# Patient Record
Sex: MALE | Race: WHITE | NOT HISPANIC OR LATINO | Employment: OTHER
[De-identification: names, ages, dates, MRNs, and addresses within clinical notes are randomized per-mention and may not be internally consistent; named-entity substitution may affect disease eponyms.]

---

## 2019-11-07 ENCOUNTER — HEALTH MAINTENANCE LETTER (OUTPATIENT)
Age: 64
End: 2019-11-07

## 2020-02-17 ENCOUNTER — HEALTH MAINTENANCE LETTER (OUTPATIENT)
Age: 65
End: 2020-02-17

## 2020-07-13 ENCOUNTER — APPOINTMENT (OUTPATIENT)
Dept: MRI IMAGING | Facility: CLINIC | Age: 65
End: 2020-07-13
Attending: EMERGENCY MEDICINE
Payer: COMMERCIAL

## 2020-07-13 ENCOUNTER — HOSPITAL ENCOUNTER (OUTPATIENT)
Facility: CLINIC | Age: 65
Setting detail: OBSERVATION
Discharge: HOME | End: 2020-07-13
Attending: EMERGENCY MEDICINE | Admitting: INTERNAL MEDICINE
Payer: COMMERCIAL

## 2020-07-13 ENCOUNTER — APPOINTMENT (OUTPATIENT)
Dept: CT IMAGING | Facility: CLINIC | Age: 65
End: 2020-07-13
Attending: EMERGENCY MEDICINE
Payer: COMMERCIAL

## 2020-07-13 VITALS
HEIGHT: 69 IN | OXYGEN SATURATION: 97 % | DIASTOLIC BLOOD PRESSURE: 72 MMHG | SYSTOLIC BLOOD PRESSURE: 122 MMHG | BODY MASS INDEX: 26.66 KG/M2 | WEIGHT: 180 LBS | RESPIRATION RATE: 18 BRPM | TEMPERATURE: 97.3 F | HEART RATE: 56 BPM

## 2020-07-13 DIAGNOSIS — G45.9 TIA (TRANSIENT ISCHEMIC ATTACK): ICD-10-CM

## 2020-07-13 DIAGNOSIS — E78.2 COMBINED HYPERLIPIDEMIA: ICD-10-CM

## 2020-07-13 PROBLEM — Z86.73 HISTORY OF CVA (CEREBROVASCULAR ACCIDENT): Chronic | Status: ACTIVE | Noted: 2020-07-13

## 2020-07-13 LAB
ANION GAP SERPL CALCULATED.3IONS-SCNC: 5 MMOL/L (ref 3–14)
APTT PPP: 32 SEC (ref 22–37)
BASOPHILS # BLD AUTO: 0 10E9/L (ref 0–0.2)
BASOPHILS NFR BLD AUTO: 0.5 %
BUN SERPL-MCNC: 27 MG/DL (ref 7–30)
CALCIUM SERPL-MCNC: 9 MG/DL (ref 8.5–10.1)
CHLORIDE SERPL-SCNC: 111 MMOL/L (ref 94–109)
CO2 SERPL-SCNC: 23 MMOL/L (ref 20–32)
CREAT SERPL-MCNC: 0.95 MG/DL (ref 0.66–1.25)
DIFFERENTIAL METHOD BLD: NORMAL
EOSINOPHIL # BLD AUTO: 0.2 10E9/L (ref 0–0.7)
EOSINOPHIL NFR BLD AUTO: 2.6 %
ERYTHROCYTE [DISTWIDTH] IN BLOOD BY AUTOMATED COUNT: 14.1 % (ref 10–15)
GFR SERPL CREATININE-BSD FRML MDRD: 83 ML/MIN/{1.73_M2}
GLUCOSE SERPL-MCNC: 97 MG/DL (ref 70–99)
HCT VFR BLD AUTO: 47.4 % (ref 40–53)
HGB BLD-MCNC: 15.2 G/DL (ref 13.3–17.7)
IMM GRANULOCYTES # BLD: 0 10E9/L (ref 0–0.4)
IMM GRANULOCYTES NFR BLD: 0.2 %
INR PPP: 1.08 (ref 0.86–1.14)
LYMPHOCYTES # BLD AUTO: 1.4 10E9/L (ref 0.8–5.3)
LYMPHOCYTES NFR BLD AUTO: 21.6 %
MCH RBC QN AUTO: 27 PG (ref 26.5–33)
MCHC RBC AUTO-ENTMCNC: 32.1 G/DL (ref 31.5–36.5)
MCV RBC AUTO: 84 FL (ref 78–100)
MONOCYTES # BLD AUTO: 0.7 10E9/L (ref 0–1.3)
MONOCYTES NFR BLD AUTO: 10.7 %
NEUTROPHILS # BLD AUTO: 4.2 10E9/L (ref 1.6–8.3)
NEUTROPHILS NFR BLD AUTO: 64.4 %
NRBC # BLD AUTO: 0 10*3/UL
NRBC BLD AUTO-RTO: 0 /100
PLATELET # BLD AUTO: 175 10E9/L (ref 150–450)
POTASSIUM SERPL-SCNC: 4.1 MMOL/L (ref 3.4–5.3)
RBC # BLD AUTO: 5.63 10E12/L (ref 4.4–5.9)
SODIUM SERPL-SCNC: 139 MMOL/L (ref 133–144)
TROPONIN I SERPL-MCNC: <0.015 UG/L (ref 0–0.04)
WBC # BLD AUTO: 6.4 10E9/L (ref 4–11)

## 2020-07-13 PROCEDURE — 99207 ZZC CDG-HISTORY COMP: MEETS EXP. PROBLEM FOCUSED-DOWN CODED LACK OF ROS: CPT | Performed by: INTERNAL MEDICINE

## 2020-07-13 PROCEDURE — 96374 THER/PROPH/DIAG INJ IV PUSH: CPT

## 2020-07-13 PROCEDURE — 70553 MRI BRAIN STEM W/O & W/DYE: CPT

## 2020-07-13 PROCEDURE — C9803 HOPD COVID-19 SPEC COLLECT: HCPCS

## 2020-07-13 PROCEDURE — 25000128 H RX IP 250 OP 636: Performed by: FAMILY MEDICINE

## 2020-07-13 PROCEDURE — 93005 ELECTROCARDIOGRAM TRACING: CPT

## 2020-07-13 PROCEDURE — 70450 CT HEAD/BRAIN W/O DYE: CPT

## 2020-07-13 PROCEDURE — 93010 ELECTROCARDIOGRAM REPORT: CPT | Mod: Z6 | Performed by: EMERGENCY MEDICINE

## 2020-07-13 PROCEDURE — 25500064 ZZH RX 255 OP 636: Performed by: EMERGENCY MEDICINE

## 2020-07-13 PROCEDURE — G0378 HOSPITAL OBSERVATION PER HR: HCPCS

## 2020-07-13 PROCEDURE — 85610 PROTHROMBIN TIME: CPT | Performed by: EMERGENCY MEDICINE

## 2020-07-13 PROCEDURE — 85730 THROMBOPLASTIN TIME PARTIAL: CPT | Performed by: EMERGENCY MEDICINE

## 2020-07-13 PROCEDURE — 25800030 ZZH RX IP 258 OP 636: Performed by: EMERGENCY MEDICINE

## 2020-07-13 PROCEDURE — 25000128 H RX IP 250 OP 636: Performed by: RADIOLOGY

## 2020-07-13 PROCEDURE — 25000132 ZZH RX MED GY IP 250 OP 250 PS 637: Performed by: EMERGENCY MEDICINE

## 2020-07-13 PROCEDURE — 85025 COMPLETE CBC W/AUTO DIFF WBC: CPT | Performed by: EMERGENCY MEDICINE

## 2020-07-13 PROCEDURE — A9585 GADOBUTROL INJECTION: HCPCS | Performed by: EMERGENCY MEDICINE

## 2020-07-13 PROCEDURE — 99291 CRITICAL CARE FIRST HOUR: CPT | Mod: 25 | Performed by: EMERGENCY MEDICINE

## 2020-07-13 PROCEDURE — 99291 CRITICAL CARE FIRST HOUR: CPT | Mod: 25

## 2020-07-13 PROCEDURE — 80048 BASIC METABOLIC PNL TOTAL CA: CPT | Performed by: EMERGENCY MEDICINE

## 2020-07-13 PROCEDURE — U0003 INFECTIOUS AGENT DETECTION BY NUCLEIC ACID (DNA OR RNA); SEVERE ACUTE RESPIRATORY SYNDROME CORONAVIRUS 2 (SARS-COV-2) (CORONAVIRUS DISEASE [COVID-19]), AMPLIFIED PROBE TECHNIQUE, MAKING USE OF HIGH THROUGHPUT TECHNOLOGIES AS DESCRIBED BY CMS-2020-01-R: HCPCS | Performed by: EMERGENCY MEDICINE

## 2020-07-13 PROCEDURE — 96372 THER/PROPH/DIAG INJ SC/IM: CPT | Mod: XS

## 2020-07-13 PROCEDURE — 25000128 H RX IP 250 OP 636: Performed by: EMERGENCY MEDICINE

## 2020-07-13 PROCEDURE — 84484 ASSAY OF TROPONIN QUANT: CPT | Performed by: EMERGENCY MEDICINE

## 2020-07-13 PROCEDURE — 70496 CT ANGIOGRAPHY HEAD: CPT

## 2020-07-13 PROCEDURE — 99292 CRITICAL CARE ADDL 30 MIN: CPT

## 2020-07-13 PROCEDURE — 99218 ZZC INITIAL OBSERVATION CARE,LEVL I: CPT | Performed by: INTERNAL MEDICINE

## 2020-07-13 PROCEDURE — 25000125 ZZHC RX 250: Performed by: RADIOLOGY

## 2020-07-13 RX ORDER — ONDANSETRON 4 MG/1
4 TABLET, ORALLY DISINTEGRATING ORAL EVERY 6 HOURS PRN
Status: DISCONTINUED | OUTPATIENT
Start: 2020-07-13 | End: 2020-07-13 | Stop reason: HOSPADM

## 2020-07-13 RX ORDER — ONDANSETRON 4 MG/1
4 TABLET, ORALLY DISINTEGRATING ORAL EVERY 6 HOURS PRN
Status: DISCONTINUED | OUTPATIENT
Start: 2020-07-13 | End: 2020-07-13

## 2020-07-13 RX ORDER — OLANZAPINE 10 MG/2ML
5 INJECTION, POWDER, FOR SOLUTION INTRAMUSCULAR DAILY PRN
Status: DISCONTINUED | OUTPATIENT
Start: 2020-07-13 | End: 2020-07-13 | Stop reason: HOSPADM

## 2020-07-13 RX ORDER — ASPIRIN 325 MG
325 TABLET ORAL DAILY
COMMUNITY
Start: 2020-08-04 | End: 2021-04-11

## 2020-07-13 RX ORDER — ATORVASTATIN CALCIUM 20 MG/1
40 TABLET, FILM COATED ORAL AT BEDTIME
Status: DISCONTINUED | OUTPATIENT
Start: 2020-07-13 | End: 2020-07-13 | Stop reason: HOSPADM

## 2020-07-13 RX ORDER — ASPIRIN 81 MG/1
81 TABLET ORAL DAILY
Status: DISCONTINUED | OUTPATIENT
Start: 2020-07-14 | End: 2020-07-13 | Stop reason: HOSPADM

## 2020-07-13 RX ORDER — CLOPIDOGREL BISULFATE 75 MG/1
300 TABLET ORAL ONCE
Status: COMPLETED | OUTPATIENT
Start: 2020-07-13 | End: 2020-07-13

## 2020-07-13 RX ORDER — ASPIRIN 81 MG/1
81 TABLET, CHEWABLE ORAL ONCE
Status: COMPLETED | OUTPATIENT
Start: 2020-07-13 | End: 2020-07-13

## 2020-07-13 RX ORDER — ATORVASTATIN CALCIUM 40 MG/1
40 TABLET, FILM COATED ORAL DAILY
Qty: 30 TABLET | Refills: 0 | Status: ON HOLD | OUTPATIENT
Start: 2020-07-13 | End: 2021-04-18

## 2020-07-13 RX ORDER — LORAZEPAM 2 MG/ML
1 INJECTION INTRAMUSCULAR ONCE
Status: COMPLETED | OUTPATIENT
Start: 2020-07-13 | End: 2020-07-13

## 2020-07-13 RX ORDER — ONDANSETRON 2 MG/ML
4 INJECTION INTRAMUSCULAR; INTRAVENOUS EVERY 6 HOURS PRN
Status: DISCONTINUED | OUTPATIENT
Start: 2020-07-13 | End: 2020-07-13 | Stop reason: HOSPADM

## 2020-07-13 RX ORDER — NICOTINE POLACRILEX 4 MG/1
20 GUM, CHEWING ORAL
Status: ON HOLD | COMMUNITY
Start: 2020-01-21 | End: 2021-04-11

## 2020-07-13 RX ORDER — ONDANSETRON 2 MG/ML
4 INJECTION INTRAMUSCULAR; INTRAVENOUS EVERY 6 HOURS PRN
Status: DISCONTINUED | OUTPATIENT
Start: 2020-07-13 | End: 2020-07-13

## 2020-07-13 RX ORDER — ACETAMINOPHEN 325 MG/1
650 TABLET ORAL EVERY 4 HOURS PRN
Status: DISCONTINUED | OUTPATIENT
Start: 2020-07-13 | End: 2020-07-13 | Stop reason: HOSPADM

## 2020-07-13 RX ORDER — NALOXONE HYDROCHLORIDE 0.4 MG/ML
.1-.4 INJECTION, SOLUTION INTRAMUSCULAR; INTRAVENOUS; SUBCUTANEOUS
Status: DISCONTINUED | OUTPATIENT
Start: 2020-07-13 | End: 2020-07-13 | Stop reason: HOSPADM

## 2020-07-13 RX ORDER — GADOBUTROL 604.72 MG/ML
8 INJECTION INTRAVENOUS ONCE
Status: COMPLETED | OUTPATIENT
Start: 2020-07-13 | End: 2020-07-13

## 2020-07-13 RX ORDER — AMOXICILLIN 250 MG
1-2 CAPSULE ORAL 2 TIMES DAILY
Status: DISCONTINUED | OUTPATIENT
Start: 2020-07-13 | End: 2020-07-13 | Stop reason: HOSPADM

## 2020-07-13 RX ORDER — CLOPIDOGREL BISULFATE 75 MG/1
75 TABLET ORAL DAILY
Qty: 21 TABLET | Refills: 0 | Status: ON HOLD | OUTPATIENT
Start: 2020-07-14 | End: 2021-04-11

## 2020-07-13 RX ORDER — CLOPIDOGREL BISULFATE 75 MG/1
75 TABLET ORAL DAILY
Status: DISCONTINUED | OUTPATIENT
Start: 2020-07-14 | End: 2020-07-13 | Stop reason: HOSPADM

## 2020-07-13 RX ORDER — IOPAMIDOL 755 MG/ML
70 INJECTION, SOLUTION INTRAVASCULAR ONCE
Status: COMPLETED | OUTPATIENT
Start: 2020-07-13 | End: 2020-07-13

## 2020-07-13 RX ADMIN — OLANZAPINE 5 MG: 10 INJECTION, POWDER, FOR SOLUTION INTRAMUSCULAR at 13:06

## 2020-07-13 RX ADMIN — SODIUM CHLORIDE 100 ML: 9 INJECTION, SOLUTION INTRAVENOUS at 10:45

## 2020-07-13 RX ADMIN — IOPAMIDOL 70 ML: 755 INJECTION, SOLUTION INTRAVENOUS at 10:45

## 2020-07-13 RX ADMIN — LORAZEPAM 1 MG: 2 INJECTION INTRAMUSCULAR; INTRAVENOUS at 12:10

## 2020-07-13 RX ADMIN — CLOPIDOGREL BISULFATE 300 MG: 75 TABLET ORAL at 15:00

## 2020-07-13 RX ADMIN — SODIUM CHLORIDE 500 ML: 9 INJECTION, SOLUTION INTRAVENOUS at 12:10

## 2020-07-13 RX ADMIN — ASPIRIN 81 MG 81 MG: 81 TABLET ORAL at 15:00

## 2020-07-13 RX ADMIN — GADOBUTROL 8 ML: 604.72 INJECTION INTRAVENOUS at 11:44

## 2020-07-13 ASSESSMENT — ENCOUNTER SYMPTOMS
NECK PAIN: 0
CHILLS: 0
VOMITING: 0
HEADACHES: 0
FEVER: 0
DIFFICULTY URINATING: 0
EYE PAIN: 0
ABDOMINAL PAIN: 0
COUGH: 0
HALLUCINATIONS: 1
DIARRHEA: 0
NECK STIFFNESS: 0
DIZZINESS: 0
SORE THROAT: 0
SHORTNESS OF BREATH: 0
EYE REDNESS: 0
NAUSEA: 0

## 2020-07-13 NOTE — CONSULTS
[facility]    Stroke Telephone Note    I was called by Dr. Tim Valdovinos on 07/13/20 at 1037 regarding patient Rajiv Rodriguez. The patient is a 65 year old male with history of left cerebellar and medullary infarct 2012, schizophrenia, and hyperlipidemia who presents to the [facility] ED as a stroke code for transient right hand weakness.     Stroke Code Data  (for stroke code without tele)  Stroke code activated 07/13/20   1037   First stroke provider response 07/13/20   1038   Last known normal 07/13/20   1014   Time of discovery   (or onset of symptoms) 07/13/20   1015   Head CT read by me 07/13/20   1048   Was stroke code de-escalated? Yes 07/13/20 1100  other (see comments) Symptoms resolved, no indication for acute intervention     TPA Treatment   Not given due to minor/isolated/quickly resolving symptoms.    Endovascular Treatment  Not initiated due to absence of proximal vessel occlusion    Impression  Transient Ischemic Attack    Recommendations  Transient Ischemic Attack Recommendations  - ABCD2 Score: 3  - Obs overnight  - Neurochecks Q 4 hours  - Daily aspirin 81 mg for secondary stroke prevention x21 days then 325 mg daily when plavix course completed  - Plavix (clopidogrel) 300 mg PO loading dose x 1  - Plavix (clopidogrel) 75 mg PO Daily x21 days  - Statin: continue atorvastatin 40 mg daily, consider increase to 80 in response to LDL, goal <70  - MRI Stroke Protocol completed, no acute stroke  - TTE with Bubble Study    My recommendations are based on the limited information provided on the phone by Dr. Tim Valdovinos. They are not intended to replace the clinical judgment of Dr. Tim Valdovinos which should always be utilized to provide the most appropriate care to meet the unique needs of this patient.  I was not requested to personally see or examine the patient at this time.    The Stroke Staff is Dr. Jauregui.    Catrachita Hernandez MD  Vascular Neurology Fellow  To  "page me or covering stroke neurology team member, click here: AMCOM   Choose \"On Call\" tab at top, then search dropdown box for \"Neurology Adult\", select location, press Enter, then look for stroke/neuro ICU/telestroke.    "

## 2020-07-13 NOTE — H&P
"      [facility]    History and Physical - Hospitalist Service       Date of Admission:  7/13/2020    Assessment & Plan   Rajiv Rodriguez is a 65 year old male admitted on 7/13/2020. He has a history of cerebellar CVA in 2012 without sequelae and Schizophrenia (Currently untreated). He presents with right upper extremity weakness with waxing and waning symptoms.     Transient ischemic attack  Recurring right upper extremity weakness episodes. Stroke neurology consulted in ED. CT head, CTA head and neck negative. MRI shows- \"Old infarcts are present involving the bilateral cerebellum. Lacunar infarct is present involving the left basal ganglia/internal capsule. No evidence of acute ischemia, hemorrhage, mass, mass effect, or hydrocephalus. Mild volume loss is present. Scattered white matter T2 hyperintensities likely represent chronic small vessel ischemic change.\"    Currently Asymptomatic.   - Observation overnight  - Neurochecks Q 2 hours  - Daily aspirin 81 mg for secondary stroke prevention x21 days then 325 mg daily when plavix course completed  - Plavix (clopidogrel) 300 mg PO loading dose x 1 then Plavix (clopidogrel) 75 mg PO Daily x21 days  - Continue atorvastatin 40 mg daily, consider increase to 80 in response to LDL, goal <70  - TTE with Bubble Study  - Check A1c    Schizophrenia  Pers ister he has probably been off of medications for several years. He was hearing voices 2 nights ago, and they were on theor way for psychiatric evaluation when TIA symptoms occurred. Currently denies hallucinations, calm, comfortable.  - Olanzapine 5mg IM prn  - Outpatient follow-up upon discharge       Diet:   NPO until bedside screen  DVT Prophylaxis: Low Risk/Ambulatory with no VTE prophylaxis indicated  Clark Catheter: not present  Code Status: Full code       Covid screen pending    Disposition Plan   Expected discharge: Tomorrow, recommended to prior living arrangement once Echo done and neuro " symptoms stable.  Entered: Dipesh Jenkins MD 07/13/2020, 3:58 PM     The patient's care was discussed with the Patient and Patient's Family.    Dipesh Jenkins MD  [facility]    ______________________________________________________________________    Chief Complaint   Chief Complaint   Patient presents with     One-sided Weakness     right sided sudden onset         History is obtained from the patient, electronic health record, emergency department physician and patient's sister    History of Present Illness   Rajiv Rodriguez is a 65 year old male who was brought in by sister for acute onset right hand weakness.  They were in the car on the way to an appointment for psychiatric evaluation due to development of auditory hallucinations 2 nights ago when he started not feeling well and then says he lost control of his right hand. He was unable to move or  as usual. Patients symptoms resolved shortly after arrival, later re-occurred for a brief period in the CT scanner. Sister states he has also had some brief symptoms with last episode > 1 hour ago.        Review of Systems    The 5 point Review of Systems is negative other than noted in the HPI or here.     Past Medical History        Patient Active Problem List    Diagnosis Date Noted     History of CVA (cerebrovascular accident) 07/13/2020     Priority: High     Presented 11/23/12, symptoms of sudden unsteadiness and hoarseness of voice. 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 No evidence of large vessel disease on MRI/MRA, which looks consistent with embolic stroke. No carotid disease. FRIEDA with no cardiac source of thrombus. Placed on cardiac telemetry for >48 hours. Started on full dose asa 325 mg daily        Schizophrenia (H) 02/25/2013     Priority: High     Mixed hyperlipidemia 01/06/2011     Priority: Medium     Benign prostatic  hyperplasia with lower urinary tract symptoms 12/11/2015     Priority: Low     GERD (gastroesophageal reflux disease) 09/16/2013     Priority: Low      Past Medical History:   Diagnosis Date     Acute exacerbation of chronic paranoid schizophrenia (H) 3/1/2016     Esophageal stricture 08/28/2018    Esophageal stricture 08/28/2018, s/p dilation     Stroke (H) 11/23/2012    symptoms of sudden unsteadiness and hoarseness of voice. 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       Past Surgical History   I have reviewed this patient's surgical history and updated it with pertinent information if needed.  Past Surgical History:   Procedure Laterality Date     DILATE ESOPHAGUS  08/2017    ESOPHAGEAL DILATION     PROSTATE SURGERY  08/2016    LASER OF PROSTATE W/ GREEN LIGHT PVP       Social History   I have reviewed this patient's social history and updated it with pertinent information if needed.      Family History   I have reviewed this patient's family history and updated it with pertinent information if needed.  Family History   Problem Relation Age of Onset     Alzheimer Disease Mother      Cancer Father         skin       Prior to Admission Medications   Prior to Admission Medications   Prescriptions Last Dose Informant Patient Reported? Taking?   atorvastatin (LIPITOR) 40 MG tablet   No No   Sig: Take 1 tablet (40 mg) by mouth daily   omeprazole 20 MG tablet   Yes Yes   Sig: Take 20 mg by mouth      Facility-Administered Medications: None     Allergies   No Known Allergies    Physical Exam   Vital Signs: Temp: 98.7  F (37.1  C) Temp src: Oral BP: 108/75 Pulse: 60 Heart Rate: 53 Resp: 11 SpO2: 96 % O2 Device: None (Room air)    Weight: 180 lbs 0 oz    Constitutional: awake, alert, cooperative, no apparent distress, and appears stated age  Eyes: Lids and lashes normal, pupils equal, round and reactive to light, extra  ocular muscles intact, sclera clear, conjunctiva normal. Somwhat sustained nystagmus with lateral gaze left>right   ENT: edentulous  Respiratory: No increased work of breathing, good air exchange, clear to auscultation bilaterally, no crackles or wheezing  Cardiovascular: Normal apical impulse, regular rate and rhythm, normal S1 and S2, no S3 or S4, and no murmur noted  GI: No scars, normal bowel sounds, soft, non-distended, non-tender, no masses palpated, no hepatosplenomegally  Musculoskeletal: There is no redness, warmth, or swelling of the joints.  Full range of motion noted.  Motor strength is 5 out of 5 all extremities bilaterally.  Tone is normal.  Neurologic:   CV 2-12 are intact  Mental Status Exam:  Level of Alertness:   awake  Orientation:   person, place, time  Memory:   normal  Attention/Concentration:  normal  Coordination:  Finger/Nose:  bilateral :  normal  Rapid Alternating Movements:  bilateral :  normal  Plantar Response:  Difficult due to withdrawal  Neuropsychiatric: Affect: normal, pleasant and somewhat withdrawn    Data   Data reviewed today: I reviewed all medications, new labs and imaging results over the last 24 hours. I personally reviewed no images or EKG's today.    ROUTINE ICU LABS (Last four results)  CMP  Recent Labs   Lab 07/13/20  1040      POTASSIUM 4.1   CHLORIDE 111*   CO2 23   ANIONGAP 5   GLC 97   BUN 27   CR 0.95   GFRESTIMATED 83   GFRESTBLACK >90   LEROY 9.0     CBC  Recent Labs   Lab 07/13/20  1040   WBC 6.4   RBC 5.63   HGB 15.2   HCT 47.4   MCV 84   MCH 27.0   MCHC 32.1   RDW 14.1        INR  Recent Labs   Lab 07/13/20  1040   INR 1.08     Results for orders placed or performed during the hospital encounter of 07/13/20   CT Head w/o Contrast    Narrative    CT SCAN OF THE HEAD WITHOUT CONTRAST   7/13/2020 10:48 AM     HISTORY: Focal neuro deficit, less than 6 hours, stroke suspected.    TECHNIQUE:  Axial images of the head and coronal reformations without  IV  contrast material.  Radiation dose for this scan was reduced using  automated exposure control, adjustment of the mA and/or kV according  to patient size, or iterative reconstruction technique.    COMPARISON: None.    FINDINGS: Few minimal nonspecific white matter changes are present  without mass effect. There is also some slight asymmetry in the  inferior left cerebellum which is presumably congenital. Ventricles  and subarachnoid spaces are minimally prominent consistent with some  mild cerebral atrophy. There is no evidence for acute hemorrhage,  acute infarct, mass effect, or skull fracture. Vascular calcifications  are noted. There is some opacification in the right frontal sinus.  Visualized paranasal sinuses are otherwise clear. Mastoid air cells  are clear.      Impression    IMPRESSION: Chronic changes. No evidence for intracranial hemorrhage  or any acute process.    TSERING WILLSON MD   CTA Head Neck with Contrast    Narrative    CTA  HEAD/NECK WITH CONTRAST July 13, 2020 10:57 AM     HISTORY: Acute onset focal neuro deficit. Acute right-sided weakness.    TECHNIQUE: Axial images were obtained through the brain with  intravenous contrast. 70 mL of Isovue-370 was given. Multiplanar  reconstructions were performed. 3-D reconstructions off a remote  workstation for CT angiography were also acquired. Carotid stenoses  were provided by comparing the caliber of the proximal internal  carotid artery to the caliber of the distal internal carotid artery.  Radiation dose for this scan was reduced using automated exposure  control, adjustment of the mA and/or kV according to patient size, or  iterative reconstruction technique.    FINDINGS:    Brachiocephalic vessels: Normal.    Right carotid system: Normal.    Left carotid system: Normal.    Right vertebral artery: Normal.    Left vertebral artery: Normal.    [place]: There is some minimal calcific plaque in the carotid  siphon regions bilaterally but no  stenosis. The basilar artery is  widely patent. The proximal anterior, middle, and posterior cerebral  arteries are patent. There is no evidence for aneurysm,  thromboembolism, stenosis, or dissection.    Other findings: Degenerative changes are seen in the spine. Calcified  lymph nodes are seen in the mediastinum.      Impression    IMPRESSION:  1. Minimal calcific plaque involving the carotid siphons without  stenosis.  2. No evidence for stenosis, dissection, thromboembolism, or aneurysm.    TSERING WILLSON MD   MR Brain w/o & w Contrast    Narrative    MRI BRAIN WITHOUT AND WITH CONTRAST  7/13/2020 1:52 PM    HISTORY:  Stuttering course of right upper extremity and right lower  extremity weakness, dysmetria in bilateral upper extremities.     TECHNIQUE:  Multiplanar, multisequence MRI of the brain without and  with 8 mL Gadavist.    COMPARISON: Head CT 7/13/2020, head MRI 11/23/2012    FINDINGS:  Old infarcts are present involving the bilateral  cerebellum. Lacunar infarct is present involving the left basal  ganglia/internal capsule. No evidence of acute ischemia, hemorrhage,  mass, mass effect, or hydrocephalus. Mild volume loss is present.  Scattered white matter T2 hyperintensities likely represent chronic  small vessel ischemic change.    The visualized calvarium, tympanic cavities, mastoid cavities, and  extra cranial soft tissues are unremarkable. Mild paranasal sinus  mucosal thickening.      Impression    IMPRESSION:  1. No evidence of acute ischemia or hemorrhage.  2. Multiple old infarcts.    MEGHAN ROSE MD

## 2020-07-13 NOTE — ED NOTES
"Pt unable to perform MRI after receiving ativan IV.   Pt states \"Can't you knock me out, I hear voices in there\"   Provider updated.    MRI tech going to talk with pt at this time.  "

## 2020-07-13 NOTE — ED NOTES
Pt presents to ED with concerns of stroke like symptoms.    Code stroke called at 1035 by  Aruna  Stroke eval called at 1030. Aruna    Pt is alert and oriented.     Last known normal: 1035    Dysphagia screening deferred due to clinical situation.

## 2020-07-13 NOTE — ED NOTES
Pt present to ed with sister via private car.  Pt had sudden onset with right sided weakness.  Pt was on his way to  psych d/t schizophrenia eval (has hx, has not been on medication for a couple of years), has had auditory hallucinations since Saturday.  Pt told sister to please stop at [place] d/t sudden onset of confusion and weakness. Pt presents to ED confused, unable to tell staff his last name.  Pt symptoms resolved approx 5 minutes after arrival.

## 2020-07-13 NOTE — PROGRESS NOTES
"[place] [facility] ADMISSION NOTE    Patient admitted to room 2213 at approximately 4pm via W/C accompanied by sister and E D staff from [facility].     Verbal SBAR report received from  E D staff prior to patient arrival.     Patient ambulated with assistance to bed Patient  Appearing very tired, poor eye contact with staff but cooperative. Oriented  Sister and self. Does not answer questions from staff except yes and no  . Admission vital signs: Blood pressure 122/72, pulse 56, temperature 97.3  F (36.3  C), temperature source Oral, resp. rate 18, height 1.744 m (5' 8.66\"), weight 81.6 kg (180 lb), SpO2 97 %. PATIENT, FAMILY MEMBER, was oriented to plan of care, ROOM ORIENTATION:\"call light\",\"bed controls\",\"tv\",\"telephone\",\"bathroom\",\"visiting hours\". Patient stating to sister \"I want to go home\". Sister requests to see physician and wishes to take patient home.    Risk Assessment    The following safety risks were identified during admission: safety risks:631982. Yellow risk band  yes.     Skin Initial Assessment    This writer admitted this patient and patient refuses a skin assessment .  William score in the Adult PCS flowsheet. Appropriate interventions initiated as needed.     Secondary skin check completed by Not completed.         Education    Patient has a [place] to Observation order: YES   Observation education completed and documented:YES      Dolly Queen RN    "

## 2020-07-13 NOTE — ED PROVIDER NOTES
"  History     Chief Complaint   Patient presents with     One-sided Weakness     right sided sudden onset     HPI  Rajiv Rodriguez is a 65 year old male with history remote CVA, and schizophrenia (not any medications), dyslipidemia who was brought in by sister for acute onset right hand weakness.  They were in the car on the way downtown to an unrelated appointment when he started not feeling well and then says he lost control of his right hand.  Was unable to move or  as usual.  His sister drove him directly to [facility] emergency department.  Last known well at approximate 10:15 AM this morning (approximately 15 minutes prior to arrival).  Patient and patient's history concerned he may be having a stroke.  Patient reports that he takes full dose aspirin, a statin, and \"something for reflux\".     Upon arrival to Emergency Department, his symptoms of weakness have resolved.     The patient's PMHx, Surgical Hx, Allergies, and Medications were all reviewed with the patient.    Allergies:  No Known Allergies    Problem List:    Patient Active Problem List    Diagnosis Date Noted     Acute exacerbation of chronic paranoid schizophrenia (H) 03/01/2016     Priority: Medium     Stroke (H) 11/23/2012     Priority: Medium        Past Medical History:    Past Medical History:   Diagnosis Date     High cholesterol      Schizophrenia, schizo-affective type (H)      Unspecified cerebral artery occlusion with cerebral infarction        Past Surgical History:    No past surgical history on file.    Family History:    Family History   Problem Relation Age of Onset     Alzheimer Disease Mother      Cancer Father         skin       Social History:  Marital Status:  Single [1]  Social History     Tobacco Use     Smoking status: Never Smoker     Smokeless tobacco: Never Used   Substance Use Topics     Alcohol use: No     Drug use: No        Medications:    atorvastatin (LIPITOR) 40 MG tablet  OLANZapine (ZYPREXA) 15 MG " "tablet          Review of Systems   Constitutional: Negative for chills and fever.   HENT: Negative for congestion and sore throat.    Eyes: Negative for pain and redness.   Respiratory: Negative for cough and shortness of breath.    Cardiovascular: Negative for chest pain.   Gastrointestinal: Negative for abdominal pain, diarrhea, nausea and vomiting.   Genitourinary: Negative for difficulty urinating.   Musculoskeletal: Negative for neck pain and neck stiffness.   Skin: Negative for rash.   Neurological: Negative for dizziness and headaches.   Psychiatric/Behavioral: Positive for hallucinations. Negative for self-injury and suicidal ideas.       Physical Exam   BP: (!) 127/91  Pulse: 61  Heart Rate: 64  Temp: 98.7  F (37.1  C)  Resp: 16  Height: 174.4 cm (5' 8.66\")  Weight: 81.6 kg (180 lb)  SpO2: 99 %    Physical Exam  General: Awake, alert, and cooperative. No acute distress  Mental Status: Oriented x 3. Speech normal.  Head: Normocephalic, atraumatic, symmetric.   Eyes: Conjunctiva normal, no discharge, PERRLA 2 mm. No neglect/hemianopsia  Ears, Nose, Throat: External ears and nares normal.  No oral exudates. Oral mucosa moist.  Neck: Supple. No adenopathy. Non-tender.   Cardiovascular: Regular rate. Regular rhythm. Peripheral pulses strong. Normal capillary refill. No LE edema.   Respiratory: Normal effort. No wheezes, rales, or rhonchi bilaterally.  Chest Wall: Equal rise.  Abdomen: No tenderess, soft, non-distended. No rebound or guarding.  Back: Atraumatic.   Musculoskeletal: No gross deformity. Warm and well perfused  Neurologic: Mental status: Alert, awake. Oriented to self, date, and place. Normal speech and language. GCS 15  Cranial Nerves: II-XII intact  Motor: Follows commands x 4 extremities. Down going Babinski's. No Clonus.   Upper Extremities:   RUE: 5/5 shoulder abduction. 5/5 elbow flex/ext. 5/5 wrist flex/ext. 5/5 hand .   LUE: 5/5 shoulder abduction. 5/5 elbow flex/ext. 5/5 wrist flex/ext. " 5/5 hand .   Lower Extremities:   RLE: 5/5 hip flexion. 5/5 knee flex/ext. 5/5 ankle plantar-/dorsiflexion. 5/5 EHL.   LLE: 5/5 hip flexion. 5/5 knee flex/ext. 5/5 ankle plantar-/dorsiflexion. 5/5 EHL   Sensory: Sensation intact to light touch in all 4 extremities   Coordination: Dysmetria with finger to nose and heel to shin on right.  Skin: Warm, dry, no pallor, no erythema, no jaundice or rash.      ED Course     ED Course as of Jul 13 1510   Mon Jul 13, 2020   1044 Spoke with stroke neurology          Procedures             EKG Interpretation:      Interpreted by Tim Almazan MD  Time reviewed: 1115  Symptoms at time of EKG: weakness  Rhythm: sinus bradycardia  Rate: 54  Axis: Left Axis Deviation  Ectopy: none  Conduction: normal  ST Segments/ T Waves: Non-specific ST-T wave changes  Q Waves: none  Comparison to prior: No significant change compared to 11/23/2012    Clinical Impression: no acute changes and non-specific EKG          Critical Care time:  was 40 minutes for this patient excluding procedures.     The patient has stroke symptoms:         ED Stroke specific documentation           NIHSS PDF     Patient last known well time: 1015  ED Provider first to bedside at: 1030  CT Results received at: 1114    tPA:   Not given due to minor/isolated/quickly resolving symptoms.    If treating with tPA: Ensure SBP<185 and DBP<105 prior to treatment with IV tPA.  Administering IV tPA after treatment with IV labetalol, hydralazine, or nicardipine is reasonable once BP control is established.    Endovascular Retrieval:  Not initiated due to absence of proximal vessel occlusion    National Institutes of Health Stroke Scale (Baseline)  Time Performed: 1030     Score    Level of consciousness: (0)   Alert, keenly responsive    LOC questions: (0)   Answers both questions correctly    LOC commands: (0)   Performs both tasks correctly    Best gaze: (0)   Normal    Visual: (0)   No visual loss    Facial palsy:  (0)   Normal symmetrical movements    Motor arm (left): (0)   No drift    Motor arm (right): (0)   No drift    Motor leg (left): (0)   No drift    Motor leg (right): (0)   No drift    Limb ataxia: (2)   Present in two limbs    Sensory: (0)   Normal- no sensory loss    Best language: (0)   Normal- no aphasia    Dysarthria: (0)   Normal    Extinction and inattention: (0)   No abnormality        Total Score:  2        Stroke Mimics were considered (including migraine headache, seizure disorder, hypoglycemia (or hyperglycemia), head or spinal trauma, CNS infection, Toxin ingestion and shock state (e.g. sepsis) .                     Results for orders placed or performed during the hospital encounter of 07/13/20 (from the past 24 hour(s))   CBC with platelets differential   Result Value Ref Range    WBC 6.4 4.0 - 11.0 10e9/L    RBC Count 5.63 4.4 - 5.9 10e12/L    Hemoglobin 15.2 13.3 - 17.7 g/dL    Hematocrit 47.4 40.0 - 53.0 %    MCV 84 78 - 100 fl    MCH 27.0 26.5 - 33.0 pg    MCHC 32.1 31.5 - 36.5 g/dL    RDW 14.1 10.0 - 15.0 %    Platelet Count 175 150 - 450 10e9/L    Diff Method Automated Method     % Neutrophils 64.4 %    % Lymphocytes 21.6 %    % Monocytes 10.7 %    % Eosinophils 2.6 %    % Basophils 0.5 %    % Immature Granulocytes 0.2 %    Nucleated RBCs 0 0 /100    Absolute Neutrophil 4.2 1.6 - 8.3 10e9/L    Absolute Lymphocytes 1.4 0.8 - 5.3 10e9/L    Absolute Monocytes 0.7 0.0 - 1.3 10e9/L    Absolute Eosinophils 0.2 0.0 - 0.7 10e9/L    Absolute Basophils 0.0 0.0 - 0.2 10e9/L    Abs Immature Granulocytes 0.0 0 - 0.4 10e9/L    Absolute Nucleated RBC 0.0    Basic metabolic panel   Result Value Ref Range    Sodium 139 133 - 144 mmol/L    Potassium 4.1 3.4 - 5.3 mmol/L    Chloride 111 (H) 94 - 109 mmol/L    Carbon Dioxide 23 20 - 32 mmol/L    Anion Gap 5 3 - 14 mmol/L    Glucose 97 70 - 99 mg/dL    Urea Nitrogen 27 7 - 30 mg/dL    Creatinine 0.95 0.66 - 1.25 mg/dL    GFR Estimate 83 >60 mL/min/[1.73_m2]    GFR  Estimate If Black >90 >60 mL/min/[1.73_m2]    Calcium 9.0 8.5 - 10.1 mg/dL   INR   Result Value Ref Range    INR 1.08 0.86 - 1.14   Partial thromboplastin time   Result Value Ref Range    PTT 32 22 - 37 sec   Troponin I   Result Value Ref Range    Troponin I ES <0.015 0.000 - 0.045 ug/L   CT Head w/o Contrast    Narrative    CT SCAN OF THE HEAD WITHOUT CONTRAST   7/13/2020 10:48 AM     HISTORY: Focal neuro deficit, less than 6 hours, stroke suspected.    TECHNIQUE:  Axial images of the head and coronal reformations without  IV contrast material.  Radiation dose for this scan was reduced using  automated exposure control, adjustment of the mA and/or kV according  to patient size, or iterative reconstruction technique.    COMPARISON: None.    FINDINGS: Few minimal nonspecific white matter changes are present  without mass effect. There is also some slight asymmetry in the  inferior left cerebellum which is presumably congenital. Ventricles  and subarachnoid spaces are minimally prominent consistent with some  mild cerebral atrophy. There is no evidence for acute hemorrhage,  acute infarct, mass effect, or skull fracture. Vascular calcifications  are noted. There is some opacification in the right frontal sinus.  Visualized paranasal sinuses are otherwise clear. Mastoid air cells  are clear.      Impression    IMPRESSION: Chronic changes. No evidence for intracranial hemorrhage  or any acute process.    TSERING WILLSON MD   CTA Head Neck with Contrast    Narrative    CTA  HEAD/NECK WITH CONTRAST July 13, 2020 10:57 AM     HISTORY: Acute onset focal neuro deficit. Acute right-sided weakness.    TECHNIQUE: Axial images were obtained through the brain with  intravenous contrast. 70 mL of Isovue-370 was given. Multiplanar  reconstructions were performed. 3-D reconstructions off a remote  workstation for CT angiography were also acquired. Carotid stenoses  were provided by comparing the caliber of the proximal  internal  carotid artery to the caliber of the distal internal carotid artery.  Radiation dose for this scan was reduced using automated exposure  control, adjustment of the mA and/or kV according to patient size, or  iterative reconstruction technique.    FINDINGS:    Brachiocephalic vessels: Normal.    Right carotid system: Normal.    Left carotid system: Normal.    Right vertebral artery: Normal.    Left vertebral artery: Normal.    [place]: There is some minimal calcific plaque in the carotid  siphon regions bilaterally but no stenosis. The basilar artery is  widely patent. The proximal anterior, middle, and posterior cerebral  arteries are patent. There is no evidence for aneurysm,  thromboembolism, stenosis, or dissection.    Other findings: Degenerative changes are seen in the spine. Calcified  lymph nodes are seen in the mediastinum.      Impression    IMPRESSION:  1. Minimal calcific plaque involving the carotid siphons without  stenosis.  2. No evidence for stenosis, dissection, thromboembolism, or aneurysm.    TSERING WILLSON MD   MR Brain w/o & w Contrast    Narrative    MRI BRAIN WITHOUT AND WITH CONTRAST  7/13/2020 1:52 PM    HISTORY:  Stuttering course of right upper extremity and right lower  extremity weakness, dysmetria in bilateral upper extremities.     TECHNIQUE:  Multiplanar, multisequence MRI of the brain without and  with 8 mL Gadavist.    COMPARISON: Head CT 7/13/2020, head MRI 11/23/2012    FINDINGS:  Old infarcts are present involving the bilateral  cerebellum. Lacunar infarct is present involving the left basal  ganglia/internal capsule. No evidence of acute ischemia, hemorrhage,  mass, mass effect, or hydrocephalus. Mild volume loss is present.  Scattered white matter T2 hyperintensities likely represent chronic  small vessel ischemic change.    The visualized calvarium, tympanic cavities, mastoid cavities, and  extra cranial soft tissues are unremarkable. Mild paranasal  sinus  mucosal thickening.      Impression    IMPRESSION:  1. No evidence of acute ischemia or hemorrhage.  2. Multiple old infarcts.    MEGHAN ROSE MD       Medications   OLANZapine (zyPREXA) injection 5 mg (5 mg Intramuscular Given 7/13/20 1306)   iopamidol (ISOVUE-370) solution 70 mL (70 mLs Intravenous Given 7/13/20 1045)   sodium chloride 0.9 % bag 500mL for CT scan flush use (100 mLs As instructed Given 7/13/20 1045)   0.9% sodium chloride BOLUS (500 mLs Intravenous New Bag 7/13/20 1210)   gadobutrol (GADAVIST) injection 8 mL (8 mLs Intravenous Given 7/13/20 1144)   LORazepam (ATIVAN) injection 1 mg (1 mg Intravenous Given 7/13/20 1210)   aspirin (ASA) chewable tablet 81 mg (81 mg Oral Given 7/13/20 1500)   clopidogrel (PLAVIX) tablet 300 mg (300 mg Oral Given 7/13/20 1500)       Assessments & Plan (with Medical Decision Making)   65 year old male with past medical history of CVA, GERD, and dyslipidemia with acute onset weakness in right upper extremity.  On arrival to Emergency Department, vital signs were blood pressure 127/91, temperature 98.7, pulse 64, SPO2 99% on room air.  Patient's last known well was 50 minutes prior to arrival in the emergency department.  Symptoms of weakness had resolved by time of my evaluation although he did have dysmetria.  NIH stroke scale of 2.  Unclear to me if this is residual from prior or not. stroke code called and emergent CT CTA of head obtained.  No intracranial hemorrhage or large vessel occlusion on CT.  As discussed immediately with stroke neurology as well as neuroradiology to discuss CT results.  I reviewed the images personally, radiology report as noted above.  ECG with sinus bradycardia no evidence of acute ischemia and relatively unchanged from prior ECG in 2012.  No chest pain, shortness of breath, or associated nausea vomiting or diaphoresis.  Troponin below level detection.  CBC without leukocytosis basic metabolic panel grossly normal with mild  "hyperchloremia of 111.     While patient was in CT he had return of weakness in right upper extremity and unable to lift arm off of cart.  Nurse was present at time and reports duration of less than 5 minutes.  Stroke neurology was updated on this finding and MRI of brain pending.  Patient does not have claustrophobia but did have difficult time with MR testing and initial test was aborted.  Patient was unable to hold still and was having trouble from \"hearing voices\".  Given his schizophrenia he has internal stimuli.  He was given 1 mg of IV lorazepam without desired effect.  Patient declining MRI.  I had a chance to discuss this with the patient my concerns and reasoning for the test.  I assured him that nobody would force him to do anything that he did not want to do.  Also discussed the time sensitive nature of any possible interventions.  Patient agreeable to try again and will give him 5 mg of IM olanzapine as this worked better in this instance.  He was able to tolerate the MR without difficulty.  No acute infarcts identified.  I discussed the case again with Dr. Huerta's from stroke neurology who recommends loading with 300 mg of Plavix and low-dose aspirin  plan to continue 81 mg aspirin and 75 mg of Plavix for the next 3 weeks. He did not take his daily aspirin this morning.     All results discussed with patient and his sister. COVID testing pending. He is in agreement with plan for admission,. I discussed the case with Dr. Brasher who accepted the admission. Transition orders placed.         I have reviewed the nursing notes.         New Prescriptions    No medications on file       Final diagnoses:   TIA (transient ischemic attack)     Tim Almazan MD    7/13/2020   [facility] EMERGENCY DEPARTMENT    Disclaimer: This note consists of words and symbols derived from keyboarding and dictation using voice recognition software.  As a result, there may be errors that have gone undetected.  Please " consider this when interpreting information found in this note.             Tim Almazan MD  07/13/20 2842

## 2020-07-13 NOTE — DISCHARGE SUMMARY
[facility]  Hospitalist Discharge Summary      Date of Admission:  7/13/2020  Date of Discharge:  7/13/2020  Discharging Provider: Dipesh Jenkins MD      Discharge Diagnoses   Transient ischemic attack  Schizophrenia      Follow-ups Needed After Discharge   Follow-up Appointments     Follow-up and recommended labs and tests       Follow up with primary care provider, Steven Duane Semmler, within 7 days   for hospital follow- up.  The following labs/tests are recommended:   Transthoracic echo with bubble study.             Unresulted Labs Ordered in the Past 30 Days of this Admission     Date and Time Order Name Status Description    7/13/2020 1451 Asymptomatic COVID-19 Virus (Coronavirus) by PCR In process       These results will be followed up by Semmler, Steven Duane     Discharge Disposition   Discharged to home  Condition at discharge: Stable      Hospital Course     On transfer from ED to Med Surgery floor patient expressed that he wants to go home. Became agitated attempting to get out of wheelchair, pull out IV.  Patient's sister accompanied him and was able to redirect him to an extent.    Patient and sister prefer to complete workup as an out patient which I think is reasonable, rather than having to sedate him just to observe him and get an echo    If he has recurrent symptoms would recommend evaluation at tertiary care center.     Please see H&P     Consultations This Hospital Stay   NONE    Code Status   Full Code    Time Spent on this Encounter   I, Dipesh Jenkins MD, personally saw the patient today and spent less than or equal to 30 minutes discharging this patient.       Dipesh Jenkins MD  [facility]  ______________________________________________________________________    Physical Exam   Vital Signs: Temp: 97.3  F (36.3  C) Temp src: Oral BP: 122/72 Pulse: 56 Heart Rate: 53 Resp: 18 SpO2: 97 % O2 Device: None (Room air)    Weight: 180 lbs 0  oz  Constitutional: awake, alert, cooperative, no apparent distress, and appears stated age       Primary Care Physician   Steven Duane Semmler    Discharge Orders      Reason for your hospital stay    Probable transient ischemic attack     Follow-up and recommended labs and tests     Follow up with primary care provider, Steven Duane Semmler, within 7 days for hospital follow- up.  The following labs/tests are recommended: Transthoracic echo with bubble study.     Activity    Your activity upon discharge: activity as tolerated     Echocardiogram Complete    Administration of IV contrast will be tailored to this examination per the appropriate written protocol listed in the Echocardiography department Protocol Book, or by the supervising Cardiologist. This may result in an order change.    Use of contrast is at the discretion of the supervising Cardiologist.     Diet    Follow this diet upon discharge: Regular       Significant Results and Procedures   Results for orders placed or performed during the hospital encounter of 07/13/20   CT Head w/o Contrast    Narrative    CT SCAN OF THE HEAD WITHOUT CONTRAST   7/13/2020 10:48 AM     HISTORY: Focal neuro deficit, less than 6 hours, stroke suspected.    TECHNIQUE:  Axial images of the head and coronal reformations without  IV contrast material.  Radiation dose for this scan was reduced using  automated exposure control, adjustment of the mA and/or kV according  to patient size, or iterative reconstruction technique.    COMPARISON: None.    FINDINGS: Few minimal nonspecific white matter changes are present  without mass effect. There is also some slight asymmetry in the  inferior left cerebellum which is presumably congenital. Ventricles  and subarachnoid spaces are minimally prominent consistent with some  mild cerebral atrophy. There is no evidence for acute hemorrhage,  acute infarct, mass effect, or skull fracture. Vascular calcifications  are noted. There is some  opacification in the right frontal sinus.  Visualized paranasal sinuses are otherwise clear. Mastoid air cells  are clear.      Impression    IMPRESSION: Chronic changes. No evidence for intracranial hemorrhage  or any acute process.    TSERING WILLSON MD   CTA Head Neck with Contrast    Narrative    CTA  HEAD/NECK WITH CONTRAST July 13, 2020 10:57 AM     HISTORY: Acute onset focal neuro deficit. Acute right-sided weakness.    TECHNIQUE: Axial images were obtained through the brain with  intravenous contrast. 70 mL of Isovue-370 was given. Multiplanar  reconstructions were performed. 3-D reconstructions off a remote  workstation for CT angiography were also acquired. Carotid stenoses  were provided by comparing the caliber of the proximal internal  carotid artery to the caliber of the distal internal carotid artery.  Radiation dose for this scan was reduced using automated exposure  control, adjustment of the mA and/or kV according to patient size, or  iterative reconstruction technique.    FINDINGS:    Brachiocephalic vessels: Normal.    Right carotid system: Normal.    Left carotid system: Normal.    Right vertebral artery: Normal.    Left vertebral artery: Normal.    [place]: There is some minimal calcific plaque in the carotid  siphon regions bilaterally but no stenosis. The basilar artery is  widely patent. The proximal anterior, middle, and posterior cerebral  arteries are patent. There is no evidence for aneurysm,  thromboembolism, stenosis, or dissection.    Other findings: Degenerative changes are seen in the spine. Calcified  lymph nodes are seen in the mediastinum.      Impression    IMPRESSION:  1. Minimal calcific plaque involving the carotid siphons without  stenosis.  2. No evidence for stenosis, dissection, thromboembolism, or aneurysm.    TSERING WILLSON MD   MR Brain w/o & w Contrast    Narrative    MRI BRAIN WITHOUT AND WITH CONTRAST  7/13/2020 1:52 PM    HISTORY:  Stuttering course of right  upper extremity and right lower  extremity weakness, dysmetria in bilateral upper extremities.     TECHNIQUE:  Multiplanar, multisequence MRI of the brain without and  with 8 mL Gadavist.    COMPARISON: Head CT 7/13/2020, head MRI 11/23/2012    FINDINGS:  Old infarcts are present involving the bilateral  cerebellum. Lacunar infarct is present involving the left basal  ganglia/internal capsule. No evidence of acute ischemia, hemorrhage,  mass, mass effect, or hydrocephalus. Mild volume loss is present.  Scattered white matter T2 hyperintensities likely represent chronic  small vessel ischemic change.    The visualized calvarium, tympanic cavities, mastoid cavities, and  extra cranial soft tissues are unremarkable. Mild paranasal sinus  mucosal thickening.      Impression    IMPRESSION:  1. No evidence of acute ischemia or hemorrhage.  2. Multiple old infarcts.    MEGHAN ROSE MD         Discharge Medications   Current Discharge Medication List      START taking these medications    Details   aspirin (ASA) 325 MG tablet Take 1 tablet (325 mg) by mouth daily Start after 21 days of PLAVIX    Associated Diagnoses: TIA (transient ischemic attack)      aspirin (ASA) 81 MG EC tablet Take 1 tablet (81 mg) by mouth daily for 21 days STOP AFTER 21 DAYS OF PLAVIX AND CHANGE 325 mg DAILY  Qty: 21 tablet, Refills: 0    Associated Diagnoses: TIA (transient ischemic attack)      clopidogrel (PLAVIX) 75 MG tablet 1 tablet (75 mg) by Oral or NG Tube route daily for 21 days  Qty: 21 tablet, Refills: 0    Associated Diagnoses: TIA (transient ischemic attack)         CONTINUE these medications which have CHANGED    Details   atorvastatin (LIPITOR) 40 MG tablet Take 1 tablet (40 mg) by mouth daily  Qty: 30 tablet, Refills: 0    Associated Diagnoses: Combined hyperlipidemia         CONTINUE these medications which have NOT CHANGED    Details   omeprazole 20 MG tablet Take 20 mg by mouth           Allergies   No Known Allergies

## 2020-07-13 NOTE — ED NOTES
Pt in CT and reported right arm not feeling good.   Pt unable to lift right arm off of bed.   At 1055, pt reports right arm feeling better.   Pt asked to lift right arm and touch nose with pointer finger, pt touches cheek.   Provider updated and in room for assessment.

## 2020-07-13 NOTE — PROGRESS NOTES
[place] [facility] DISCHARGE NOTE    Patient discharged to home  at 6:33 PM via w/c . Accompanied by Family Member and staff. Discharge instructions reviewed with  Patient and sister. opportunity offered to ask questions. Prescriptions  SENT. All belongings sent with patient.    Dolly Queen RN

## 2020-07-13 NOTE — ED NOTES
"Patient has  Reading to Observation  order. Patient has been given the Observation brochure -  What does Observation mean to me.\"  Patient has been given the opportunity to ask questions about observation status and their plan of care.      Devorah Wilson RN    "

## 2020-07-13 NOTE — ED NOTES
"Patient has  [place] to Observation  order. Patient has been given the Observation brochure -  What does Observation mean to me.\"  Patient has been given the opportunity to ask questions about observation status and their plan of care.      Devorah Wilson RN    "

## 2020-07-14 ENCOUNTER — HOSPITAL ENCOUNTER (EMERGENCY)
Facility: CLINIC | Age: 65
Discharge: HOME | End: 2020-07-14
Attending: EMERGENCY MEDICINE | Admitting: EMERGENCY MEDICINE
Payer: COMMERCIAL

## 2020-07-14 VITALS
WEIGHT: 185 LBS | OXYGEN SATURATION: 98 % | TEMPERATURE: 97.1 F | HEART RATE: 78 BPM | DIASTOLIC BLOOD PRESSURE: 80 MMHG | RESPIRATION RATE: 18 BRPM | BODY MASS INDEX: 27.59 KG/M2 | SYSTOLIC BLOOD PRESSURE: 110 MMHG

## 2020-07-14 LAB
SARS-COV-2 RNA SPEC QL NAA+PROBE: NOT DETECTED
SPECIMEN SOURCE: NORMAL

## 2020-07-14 PROCEDURE — 99284 EMERGENCY DEPT VISIT MOD MDM: CPT | Mod: Z6 | Performed by: EMERGENCY MEDICINE

## 2020-07-14 PROCEDURE — 99285 EMERGENCY DEPT VISIT HI MDM: CPT | Mod: 25 | Performed by: EMERGENCY MEDICINE

## 2020-07-14 PROCEDURE — 90791 PSYCH DIAGNOSTIC EVALUATION: CPT

## 2020-07-14 RX ORDER — OLANZAPINE 10 MG/1
10 TABLET, ORALLY DISINTEGRATING ORAL ONCE
Status: DISCONTINUED | OUTPATIENT
Start: 2020-07-14 | End: 2020-07-14

## 2020-07-14 RX ORDER — OLANZAPINE 5 MG/1
5 TABLET ORAL AT BEDTIME
Qty: 30 TABLET | Refills: 0 | Status: SHIPPED | OUTPATIENT
Start: 2020-07-14 | End: 2021-04-10

## 2020-07-14 RX ORDER — OLANZAPINE 5 MG/1
5 TABLET ORAL AT BEDTIME
Qty: 30 TABLET | Refills: 1 | Status: SHIPPED | OUTPATIENT
Start: 2020-07-14 | End: 2021-04-10

## 2020-07-14 NOTE — ED AVS SNAPSHOT
[facility], [place], Emergency Department  [address]  [facility] [place] [address]  Phone:  [phone]  Fax:  [phone]                                    Rajiv Rodriguez   MRN: [id number]    Department:  [facility], Emergency Department   Date of Visit:  7/14/2020           After Visit Summary Signature Page    I have received my discharge instructions, and my questions have been answered. I have discussed any challenges I see with this plan with the nurse or doctor.    ..........................................................................................................................................  Patient/Patient Representative Signature      ..........................................................................................................................................  Patient Representative Print Name and Relationship to Patient    ..................................................               ................................................  Date                                   Time    ..........................................................................................................................................  Reviewed by Signature/Title    ...................................................              ..............................................  Date                                               Time          22EPIC Rev 08/18

## 2020-07-27 NOTE — ED PROVIDER NOTES
ED Provider Note  [facility], [place]      History     Chief Complaint   Patient presents with     Hallucinations     voices     HPI  Rajiv Rodriguez is a 65 year old male with a history of schizophrenia who presents with auditory hallucinations.  He states that over the past several weeks he has noticed increasingly bothersome voices.  He was diagnosed with schizophrenia but has been off of medications for several years.  He took Zyprexa in the past, but is no longer taking this.  He has no thoughts of self harm or harming others.  He denies any drugs or alcohol.  He would like to see a psychiatrist and possibly restart medications.  His sleep and appetite have been normal.     Past Medical History  Past Medical History:   Diagnosis Date     Acute exacerbation of chronic paranoid schizophrenia (H) 3/1/2016     Esophageal stricture 08/28/2018    Esophageal stricture 08/28/2018, s/p dilation     Stroke (H) 11/23/2012    symptoms of sudden unsteadiness and hoarseness of voice. 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     Past Surgical History:   Procedure Laterality Date     DILATE ESOPHAGUS  08/2017    ESOPHAGEAL DILATION     PROSTATE SURGERY  08/2016    LASER OF PROSTATE W/ GREEN LIGHT PVP     aspirin (ASA) 81 MG EC tablet  atorvastatin (LIPITOR) 40 MG tablet  clopidogrel (PLAVIX) 75 MG tablet  OLANZapine (ZYPREXA) 5 MG tablet  OLANZapine (ZYPREXA) 5 MG tablet  [START ON 8/4/2020] aspirin (ASA) 325 MG tablet  omeprazole 20 MG tablet      No Known Allergies  Past medical history, past surgical history, medications, and allergies were reviewed with the patient. Additional pertinent items: None    Family History  Family History   Problem Relation Age of Onset     Alzheimer Disease Mother      Cancer Father         skin     Family history was reviewed with the patient. Additional pertinent items:  None    Social History  Social History     Tobacco Use     Smoking status: Never Smoker     Smokeless tobacco: Never Used   Substance Use Topics     Alcohol use: No     Drug use: No      Social history was reviewed with the patient. Additional pertinent items: None    Review of Systems  A complete review of systems was performed with pertinent positives and negatives noted in the HPI, and all other systems negative.    Physical Exam   BP: 122/84  Pulse: 60  Temp: 97.1  F (36.2  C)  Resp: 18  Weight: 83.9 kg (185 lb)  SpO2: 97 %  Physical Exam  Vitals signs and nursing note reviewed.   Constitutional:       General: He is not in acute distress.     Appearance: He is not diaphoretic.   HENT:      Head: Normocephalic and atraumatic.   Neck:      Musculoskeletal: Normal range of motion and neck supple.   Cardiovascular:      Rate and Rhythm: Normal rate.   Pulmonary:      Effort: Pulmonary effort is normal. No respiratory distress.   Abdominal:      Palpations: Abdomen is soft.   Musculoskeletal: Normal range of motion.   Skin:     General: Skin is warm and dry.   Neurological:      General: No focal deficit present.      Mental Status: He is alert and oriented to person, place, and time.      Sensory: No sensory deficit.   Psychiatric:         Mood and Affect: Mood normal.         Behavior: Behavior normal.         Judgment: Judgment normal.      Comments: Auditory hallucinations.          ED Course      Procedures                         No results found for any visits on 07/14/20.  Medications - No data to display     Assessments & Plan (with Medical Decision Making)   64 yo M with a history of schizophrenia who presents with auditory hallucinations that have been increasing in frequency over the last several months.  He has not been on medications in a few years, but was taking Zyprexa most recently.  He is not suicidal.  He will be restarted on Zyprexa 5mg at bedtime and follow up with psychiatry. He doesn't  require inpatient hospitalization at this point.  He should return if any further concerns.     I have reviewed the nursing notes. I have reviewed the findings, diagnosis, plan and need for follow up with the patient.    Discharge Medication List as of 7/14/2020  2:58 PM      START taking these medications    Details   !! OLANZapine (ZYPREXA) 5 MG tablet Take 1 tablet (5 mg) by mouth At Bedtime, Disp-30 tablet,R-1, E-Prescribe      !! OLANZapine (ZYPREXA) 5 MG tablet Take 1 tablet (5 mg) by mouth At Bedtime, Disp-30 tablet,R-0, Local Print       !! - Potential duplicate medications found. Please discuss with provider.          Final diagnoses:   None       --  Ricky Andres MD   Emergency Medicine   [facility], [place], EMERGENCY DEPARTMENT  7/14/2020     Ricky Andres MD  07/27/20 [id number]

## 2020-10-13 ENCOUNTER — MEDICAL CORRESPONDENCE (OUTPATIENT)
Dept: HEALTH INFORMATION MANAGEMENT | Facility: CLINIC | Age: 65
End: 2020-10-13

## 2020-10-14 DIAGNOSIS — F20.9 SCHIZOPHRENIA (H): ICD-10-CM

## 2020-10-14 DIAGNOSIS — Z79.899 NEED FOR PROPHYLACTIC CHEMOTHERAPY: Primary | ICD-10-CM

## 2020-10-19 DIAGNOSIS — Z79.899 HIGH RISK MEDICATION USE: Primary | ICD-10-CM

## 2020-11-29 ENCOUNTER — HEALTH MAINTENANCE LETTER (OUTPATIENT)
Age: 65
End: 2020-11-29

## 2021-04-10 ENCOUNTER — HEALTH MAINTENANCE LETTER (OUTPATIENT)
Age: 66
End: 2021-04-10

## 2021-04-10 ENCOUNTER — HOSPITAL ENCOUNTER (INPATIENT)
Facility: CLINIC | Age: 66
LOS: 8 days | Discharge: HOME | End: 2021-04-19
Attending: EMERGENCY MEDICINE | Admitting: PSYCHIATRY & NEUROLOGY
Payer: COMMERCIAL

## 2021-04-10 DIAGNOSIS — E78.2 COMBINED HYPERLIPIDEMIA: ICD-10-CM

## 2021-04-10 DIAGNOSIS — Z20.822 COVID-19 RULED OUT BY LABORATORY TESTING: ICD-10-CM

## 2021-04-10 DIAGNOSIS — E78.2 MIXED HYPERLIPIDEMIA: Chronic | ICD-10-CM

## 2021-04-10 DIAGNOSIS — F20.0 PARANOID SCHIZOPHRENIA (H): Chronic | ICD-10-CM

## 2021-04-10 DIAGNOSIS — F29 PSYCHOSIS, UNSPECIFIED PSYCHOSIS TYPE (H): ICD-10-CM

## 2021-04-10 DIAGNOSIS — G45.9 TIA (TRANSIENT ISCHEMIC ATTACK): ICD-10-CM

## 2021-04-10 DIAGNOSIS — K21.00 GASTROESOPHAGEAL REFLUX DISEASE WITH ESOPHAGITIS, UNSPECIFIED WHETHER HEMORRHAGE: Primary | ICD-10-CM

## 2021-04-10 DIAGNOSIS — Z86.73 HISTORY OF CVA (CEREBROVASCULAR ACCIDENT): Chronic | ICD-10-CM

## 2021-04-10 PROCEDURE — 99285 EMERGENCY DEPT VISIT HI MDM: CPT | Performed by: EMERGENCY MEDICINE

## 2021-04-10 PROCEDURE — 99285 EMERGENCY DEPT VISIT HI MDM: CPT | Mod: 25 | Performed by: EMERGENCY MEDICINE

## 2021-04-10 PROCEDURE — C9803 HOPD COVID-19 SPEC COLLECT: HCPCS | Performed by: EMERGENCY MEDICINE

## 2021-04-10 PROCEDURE — 90791 PSYCH DIAGNOSTIC EVALUATION: CPT

## 2021-04-10 RX ORDER — HALOPERIDOL 1 MG/1
5 TABLET ORAL 2 TIMES DAILY
Status: ON HOLD | COMMUNITY
Start: 2021-04-08 | End: 2021-04-11

## 2021-04-10 RX ORDER — ARIPIPRAZOLE 20 MG/1
20 TABLET ORAL DAILY
Status: ON HOLD | COMMUNITY
Start: 2021-04-08 | End: 2021-04-11

## 2021-04-11 ENCOUNTER — AMBULATORY - HEALTHEAST (OUTPATIENT)
Dept: CARE COORDINATION | Facility: HOSPITAL | Age: 66
End: 2021-04-11

## 2021-04-11 PROBLEM — F29 PSYCHOSIS, UNSPECIFIED PSYCHOSIS TYPE (H): Status: ACTIVE | Noted: 2021-04-11

## 2021-04-11 LAB
AMPHETAMINES UR QL SCN: NEGATIVE
ANION GAP SERPL CALCULATED.3IONS-SCNC: 7 MMOL/L (ref 3–14)
BARBITURATES UR QL: NEGATIVE
BASOPHILS # BLD AUTO: 0.1 10E9/L (ref 0–0.2)
BASOPHILS NFR BLD AUTO: 1 %
BENZODIAZ UR QL: NEGATIVE
BUN SERPL-MCNC: 22 MG/DL (ref 7–30)
CALCIUM SERPL-MCNC: 8.6 MG/DL (ref 8.5–10.1)
CANNABINOIDS UR QL SCN: NEGATIVE
CHLORIDE SERPL-SCNC: 109 MMOL/L (ref 94–109)
CO2 SERPL-SCNC: 24 MMOL/L (ref 20–32)
COCAINE UR QL: NEGATIVE
CREAT SERPL-MCNC: 0.95 MG/DL (ref 0.66–1.25)
DIFFERENTIAL METHOD BLD: NORMAL
EOSINOPHIL # BLD AUTO: 0.3 10E9/L (ref 0–0.7)
EOSINOPHIL NFR BLD AUTO: 4.9 %
ERYTHROCYTE [DISTWIDTH] IN BLOOD BY AUTOMATED COUNT: 14 % (ref 10–15)
ETHANOL UR QL SCN: NEGATIVE
FLUAV RNA RESP QL NAA+PROBE: NEGATIVE
FLUBV RNA RESP QL NAA+PROBE: NEGATIVE
GFR SERPL CREATININE-BSD FRML MDRD: 83 ML/MIN/{1.73_M2}
GLUCOSE SERPL-MCNC: 100 MG/DL (ref 70–99)
HCT VFR BLD AUTO: 46.1 % (ref 40–53)
HGB BLD-MCNC: 14.8 G/DL (ref 13.3–17.7)
IMM GRANULOCYTES # BLD: 0 10E9/L (ref 0–0.4)
IMM GRANULOCYTES NFR BLD: 0.4 %
LABORATORY COMMENT REPORT: NORMAL
LYMPHOCYTES # BLD AUTO: 1.6 10E9/L (ref 0.8–5.3)
LYMPHOCYTES NFR BLD AUTO: 23.3 %
MCH RBC QN AUTO: 27.4 PG (ref 26.5–33)
MCHC RBC AUTO-ENTMCNC: 32.1 G/DL (ref 31.5–36.5)
MCV RBC AUTO: 85 FL (ref 78–100)
MONOCYTES # BLD AUTO: 0.6 10E9/L (ref 0–1.3)
MONOCYTES NFR BLD AUTO: 9.1 %
NEUTROPHILS # BLD AUTO: 4.1 10E9/L (ref 1.6–8.3)
NEUTROPHILS NFR BLD AUTO: 61.3 %
NRBC # BLD AUTO: 0 10*3/UL
NRBC BLD AUTO-RTO: 0 /100
OPIATES UR QL SCN: NEGATIVE
PLATELET # BLD AUTO: 189 10E9/L (ref 150–450)
POTASSIUM SERPL-SCNC: 4.4 MMOL/L (ref 3.4–5.3)
RBC # BLD AUTO: 5.4 10E12/L (ref 4.4–5.9)
RSV RNA SPEC QL NAA+PROBE: NORMAL
SARS-COV-2 RNA RESP QL NAA+PROBE: NEGATIVE
SODIUM SERPL-SCNC: 140 MMOL/L (ref 133–144)
SPECIMEN SOURCE: NORMAL
WBC # BLD AUTO: 6.7 10E9/L (ref 4–11)

## 2021-04-11 PROCEDURE — 80048 BASIC METABOLIC PNL TOTAL CA: CPT | Performed by: EMERGENCY MEDICINE

## 2021-04-11 PROCEDURE — 99223 1ST HOSP IP/OBS HIGH 75: CPT | Mod: AI | Performed by: PSYCHIATRY & NEUROLOGY

## 2021-04-11 PROCEDURE — 93005 ELECTROCARDIOGRAM TRACING: CPT

## 2021-04-11 PROCEDURE — 250N000013 HC RX MED GY IP 250 OP 250 PS 637: Performed by: EMERGENCY MEDICINE

## 2021-04-11 PROCEDURE — 250N000013 HC RX MED GY IP 250 OP 250 PS 637: Performed by: PSYCHIATRY & NEUROLOGY

## 2021-04-11 PROCEDURE — 93010 ELECTROCARDIOGRAM REPORT: CPT | Performed by: INTERNAL MEDICINE

## 2021-04-11 PROCEDURE — 80307 DRUG TEST PRSMV CHEM ANLYZR: CPT | Performed by: EMERGENCY MEDICINE

## 2021-04-11 PROCEDURE — C9803 HOPD COVID-19 SPEC COLLECT: HCPCS | Performed by: EMERGENCY MEDICINE

## 2021-04-11 PROCEDURE — 99207 PR CONSULT E&M CHANGED TO SUBSEQUENT LEVEL: CPT | Performed by: NURSE PRACTITIONER

## 2021-04-11 PROCEDURE — 85025 COMPLETE CBC W/AUTO DIFF WBC: CPT | Performed by: EMERGENCY MEDICINE

## 2021-04-11 PROCEDURE — 99232 SBSQ HOSP IP/OBS MODERATE 35: CPT | Performed by: NURSE PRACTITIONER

## 2021-04-11 PROCEDURE — 124N000003 HC R&B MH SENIOR/ADOLESCENT

## 2021-04-11 PROCEDURE — 80320 DRUG SCREEN QUANTALCOHOLS: CPT | Performed by: EMERGENCY MEDICINE

## 2021-04-11 PROCEDURE — 87636 SARSCOV2 & INF A&B AMP PRB: CPT | Performed by: EMERGENCY MEDICINE

## 2021-04-11 RX ORDER — TRAZODONE HYDROCHLORIDE 50 MG/1
50 TABLET, FILM COATED ORAL
Status: DISCONTINUED | OUTPATIENT
Start: 2021-04-11 | End: 2021-04-19 | Stop reason: HOSPADM

## 2021-04-11 RX ORDER — ASPIRIN 81 MG/1
81 TABLET ORAL DAILY
Status: DISCONTINUED | OUTPATIENT
Start: 2021-04-11 | End: 2021-04-11

## 2021-04-11 RX ORDER — ACETAMINOPHEN 325 MG/1
650 TABLET ORAL EVERY 4 HOURS PRN
Status: DISCONTINUED | OUTPATIENT
Start: 2021-04-11 | End: 2021-04-12

## 2021-04-11 RX ORDER — CLOPIDOGREL BISULFATE 75 MG/1
75 TABLET ORAL DAILY
Status: ON HOLD | COMMUNITY
End: 2021-04-18

## 2021-04-11 RX ORDER — CLOPIDOGREL BISULFATE 75 MG/1
75 TABLET ORAL DAILY
Status: DISCONTINUED | OUTPATIENT
Start: 2021-04-11 | End: 2021-04-19 | Stop reason: HOSPADM

## 2021-04-11 RX ORDER — HALOPERIDOL 5 MG/1
5 TABLET ORAL 2 TIMES DAILY
Status: DISCONTINUED | OUTPATIENT
Start: 2021-04-11 | End: 2021-04-11

## 2021-04-11 RX ORDER — ATORVASTATIN CALCIUM 40 MG/1
40 TABLET, FILM COATED ORAL DAILY
Status: DISCONTINUED | OUTPATIENT
Start: 2021-04-11 | End: 2021-04-19 | Stop reason: HOSPADM

## 2021-04-11 RX ORDER — DIPHENHYDRAMINE HCL 25 MG
50 CAPSULE ORAL AT BEDTIME
Status: DISCONTINUED | OUTPATIENT
Start: 2021-04-11 | End: 2021-04-19 | Stop reason: HOSPADM

## 2021-04-11 RX ORDER — DIPHENHYDRAMINE HCL 50 MG
50 CAPSULE ORAL ONCE
Status: COMPLETED | OUTPATIENT
Start: 2021-04-11 | End: 2021-04-11

## 2021-04-11 RX ORDER — CLOPIDOGREL BISULFATE 75 MG/1
75 TABLET ORAL DAILY
Status: DISCONTINUED | OUTPATIENT
Start: 2021-04-11 | End: 2021-04-11

## 2021-04-11 RX ORDER — ASPIRIN 81 MG/1
81 TABLET ORAL DAILY
Status: DISCONTINUED | OUTPATIENT
Start: 2021-04-11 | End: 2021-04-19 | Stop reason: HOSPADM

## 2021-04-11 RX ORDER — ASPIRIN 81 MG/1
81 TABLET ORAL DAILY
Status: ON HOLD | COMMUNITY
End: 2021-04-18

## 2021-04-11 RX ORDER — ARIPIPRAZOLE 20 MG/1
20 TABLET ORAL DAILY
Status: DISCONTINUED | OUTPATIENT
Start: 2021-04-11 | End: 2021-04-13

## 2021-04-11 RX ORDER — HALOPERIDOL 5 MG/1
5 TABLET ORAL 2 TIMES DAILY
Status: DISCONTINUED | OUTPATIENT
Start: 2021-04-11 | End: 2021-04-13

## 2021-04-11 RX ORDER — ARIPIPRAZOLE 20 MG/1
20 TABLET ORAL DAILY
Status: ON HOLD | COMMUNITY
End: 2021-04-18

## 2021-04-11 RX ORDER — HALOPERIDOL 5 MG/1
5 TABLET ORAL 2 TIMES DAILY
Status: ON HOLD | COMMUNITY
End: 2021-04-18

## 2021-04-11 RX ADMIN — ASPIRIN 81 MG: 81 TABLET, COATED ORAL at 08:16

## 2021-04-11 RX ADMIN — CLOPIDOGREL BISULFATE 75 MG: 75 TABLET, FILM COATED ORAL at 08:16

## 2021-04-11 RX ADMIN — DIPHENHYDRAMINE HYDROCHLORIDE 50 MG: 25 CAPSULE ORAL at 20:23

## 2021-04-11 RX ADMIN — HALOPERIDOL 5 MG: 5 TABLET ORAL at 08:16

## 2021-04-11 RX ADMIN — ATORVASTATIN CALCIUM 40 MG: 40 TABLET, FILM COATED ORAL at 08:16

## 2021-04-11 RX ADMIN — DIPHENHYDRAMINE HYDROCHLORIDE 50 MG: 50 CAPSULE ORAL at 02:23

## 2021-04-11 RX ADMIN — ARIPIPRAZOLE 20 MG: 20 TABLET ORAL at 08:16

## 2021-04-11 RX ADMIN — OMEPRAZOLE 20 MG: 20 CAPSULE, DELAYED RELEASE ORAL at 08:16

## 2021-04-11 RX ADMIN — HALOPERIDOL 5 MG: 5 TABLET ORAL at 20:23

## 2021-04-11 ASSESSMENT — ACTIVITIES OF DAILY LIVING (ADL)
ORAL_HYGIENE: INDEPENDENT
WEAR_GLASSES_OR_BLIND: NO
TOILETING_ISSUES: NO
HYGIENE/GROOMING: INDEPENDENT
FALL_HISTORY_WITHIN_LAST_SIX_MONTHS: NO
ORAL_HYGIENE: INDEPENDENT
HYGIENE/GROOMING: INDEPENDENT
DIFFICULTY_COMMUNICATING: NO
DRESSING/BATHING_DIFFICULTY: NO
CONCENTRATING,_REMEMBERING_OR_MAKING_DECISIONS_DIFFICULTY: NO
LAUNDRY: WITH SUPERVISION
DIFFICULTY_EATING/SWALLOWING: YES
EATING/SWALLOWING: SWALLOWING SOLID FOOD
DRESS: INDEPENDENT
DOING_ERRANDS_INDEPENDENTLY_DIFFICULTY: NO
LAUNDRY: WITH SUPERVISION
WALKING_OR_CLIMBING_STAIRS_DIFFICULTY: NO
DRESS: SCRUBS (BEHAVIORAL HEALTH)

## 2021-04-11 ASSESSMENT — MIFFLIN-ST. JEOR: SCORE: 1576.42

## 2021-04-11 ASSESSMENT — ENCOUNTER SYMPTOMS: HALLUCINATIONS: 1

## 2021-04-11 NOTE — PROGRESS NOTES
Initial Psychosocial Assessment    I have reviewed the chart, met with the patient, and developed Care Plan.  Information for assessment was obtained from:     Patient: Interview and Chart: Reviewed    Presenting Problem:  Patient is a 65 year old male with a prior diagnosis of Schizophrenia who was admitted due to worsening psychotic symptoms. Pt reports that his hallucinations have become overwhelming and have not allowed him to sleep. These hallucinations are voices accusing him of his past wrong deeds and mistakes with a constant ringing in his ears. Pt is had discontinued his medication and recently restarted them.    History of Mental Health and Chemical Dependency:  2 prior hospitalizations 2014 & 2016 at [facility]    Family Description ([place], Family Psychiatric History):  Pt denies family hx of mental health concerns. Pt reports having 2 siblings no children.     Significant Life Events (Illness, Abuse, Trauma, Death):  No hx per patient     Living Situation:  Lives with his sister Anay     Educational Background:  B.s in Agriculture     Occupational History:  Worked in [email or web address] but is now retired    Financial Status:  Will be applying for SSI    Legal Issues:  None     Ethnic/Cultural Issues:  None    Spiritual Orientation:  Not Pentecostal      Service History:  None    Current Treatment Providers are:  Psych: Schoenecker, Jody Michelle, MARTÍN  [facility]  [address] [place] [address]  [phone]    Social Service Assessment/Plan:  Patient has been admitted for psychiatric stabilization for this acute crisis. Patient will meet with treatment team including a psychiatrist to have psychiatric assessment and medication management. Team will coordinate with the any outpatient medication providers to review and adjust medications as appropriate. Staff will provide therapeutic programming and structure to help maintain a safe environment for healing. 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 Per history the patient has had multiple esophageal procedures for achalasia including CRE balloon dilation w/ botox injection 2015, EGD dilation 3/2016, EGD lap Heller myotomy  and recent Barium swallow on 2019 for dysphagia which ended up showing decreased motility with EGD on 10/8/19 showing what appeared to be erosion of mesh into esophagus. She is now s/p CT abdomen and persistent vomiting and since then unable to tolerate PO food. She is now s/p esophagectomy with pyloromyotomy and feeding jejunostomy. Overnight she was agitated requiring Haldol and 1:1 observation and went into afib.     PAST MEDICAL & SURGICAL HISTORY:  Atrial fibrillation  Achalasia  Hypertension  Hypothyroidism  History of repair of hiatal hernia  H/O colectomy  S/P appendectomy  S/P hysterectomy    FAMILY HISTORY:  Unremarkable    Social History:  Denies smoking, illicit drug use or frequent alcohol consumption    Allergies  opioid-like analgesics (Other)    MEDICATIONS  (STANDING):  dexMEDEtomidine Infusion 0.05 MICROgram(s)/kG/Hr (0.556 mL/Hr) IV Continuous <Continuous>  heparin  Injectable 5000 Unit(s) SubCutaneous every 12 hours  levothyroxine Injectable 68 MICROGram(s) IV Push at bedtime  metoprolol tartrate Injectable 5 milliGRAM(s) IV Push every 6 hours  OLANZapine Disintegrating Tablet 2.5 milliGRAM(s) Oral at bedtime  pantoprazole  Injectable 40 milliGRAM(s) IV Push daily  phenylephrine    Infusion 0.5 MICROgram(s)/kG/Min (8.344 mL/Hr) IV Continuous <Continuous>    MEDICATIONS  (PRN):  HYDROmorphone  Injectable 0.25 milliGRAM(s) IV Push every 3 hours PRN Severe Pain (7 - 10)  naloxone Injectable 0.1 milliGRAM(s) IV Push every 3 minutes PRN For ANY of the following changes in patient status:  A. RR LESS THAN 10 breaths per minute, B. Oxygen saturation LESS THAN 90%, C. Sedation score of 6  ondansetron Injectable 4 milliGRAM(s) IV Push every 6 hours PRN Nausea    Review of Systems:  Constitutional:  Negative for weight change, fever, malaise  HEENT:  Negative for sinus pain, hoarseness, sore throat, dysphagia, vision changes  Cardiovascular:  Negative for chest pain, palpitations, dizziness  Respiratory:  Negative for cough, wheezing, dyspnea  Gastrointestinal:  Negative for nausea, vomiting, diarrhea, melena  Musculoskeletal:  Negative for pain, swelling, stiffness   Neuro:  Negative for weakness, numbness, headache  Psych:  Negative for anxiety, depression  Endocrine:  Negative for polyuria, polydipsia, temperature Intolerance    All other systems negative unless otherwise stated    ICU Vital Signs Last 24 Hrs  T(C): 36.3 (28 Oct 2019 05:00), Max: 36.4 (27 Oct 2019 08:00)  T(F): 97.4 (28 Oct 2019 05:00), Max: 97.5 (27 Oct 2019 08:00)  HR: 54 (28 Oct 2019 06:00) (45 - 81)  BP: 167/74 (28 Oct 2019 06:00) (95/55 - 198/98)  BP(mean): 98 (28 Oct 2019 06:00) (60 - 122)  ABP: --  ABP(mean): --  RR: 15 (28 Oct 2019 06:00) (13 - 24)  SpO2: 99% (28 Oct 2019 06:00) (96% - 100%)    Daily     Daily Weight in k (28 Oct 2019 05:00)  I&O's Summary    26 Oct 2019 07:  -  27 Oct 2019 07:00  --------------------------------------------------------  IN: 917.8 mL / OUT: 505 mL / NET: 412.8 mL    27 Oct 2019 07:01  -  28 Oct 2019 06:54  --------------------------------------------------------  IN: 872.3 mL / OUT: 1410 mL / NET: -537.7 mL    Physical Exam:  Gen: Alert, no apparent distress  CV: Bradycardic, regular rhythm; no murmurs, rubs or gallops  Pulm: Clear to auscultation bilaterally, no rales, rhonchi or wheezes  Chest: Chest tubes/drains in place with dressings clean, dry and intact  GI: Abd is soft, non-tender and non-distended with +BS  Ext: No clubbing or cyanosis; 1+ bilateral lower extremity pitting edema  Neuro: A+Ox1 (self), follows commands and moves all extremities    Labs:                          9.3    6.56  )-----------( 184      ( 28 Oct 2019 04:15 )             28.5       10-    138  |  101  |  18  ----------------------------<  131<H>  3.9   |  26  |  0.49<L>    Ca    8.5      28 Oct 2019 04:15  Phos  2.5     10-  Mg     2.1     10-28    TPro  6.1  /  Alb  3.0<L>  /  TBili  0.7  /  DBili  x   /  AST  20  /  ALT  12  /  AlkPhos  63  10-26    Assessment/Plan: 89 y/o female w/ PMH of hypertension, achalasia s/p Botox injection and dilation x2 with Heller myotomy, hiatal hernia s/p repair w/ mesh, emergency volvulus with surgical repair 2014 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    Neuro:   Pain control with Oxycodone / Tylenol IV   PCEA removed  Haldol for agitation                                          Cardiovascular:    Sinus bradycardia currently; intermittently afib with RVR, on Lopressor  Continue hemodynamic monitoring    Respiratory:  Pt is comfortable on nasal cannula  Encourage incentive spirometry    GI:  NPO on feeds at 20 cc/hr; advancing slowly to goal at 50 cc/hr  Continue Protonix  Continue Zofran for nausea - PRN	          Renal:  CHF history, edema on exam, diuresing with Lasix  Monitor I/Os and electrolytes    Hematologic / Oncology:  No signs of active bleeding, H/H stable  HSQ for DVT ppl    Infectious disease:  All surgical incision / chest tube sites look clean  No fever or other signs of infection     Endocrine:  Continue Accuchecks with coverage  Continue Synthroid    All clinical, lab, hemodynamic and radiographic data were reviewed and the plan was discussed with CTICU team.     Axel Pena MD needed. Patient will participate in unit groups and activities. Patient will receive individual and group support on the unit. CTC will coordinate with outpatient providers and will place referrals to ensure appropriate follow up care is in place.     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 Per history the patient has had multiple esophageal procedures for achalasia including CRE balloon dilation w/ botox injection 2015, EGD dilation 3/2016, EGD lap Heller myotomy  and recent Barium swallow on 2019 for dysphagia which ended up showing decreased motility with EGD on 10/8/19 showing what appeared to be erosion of mesh into esophagus. She is now s/p CT abdomen and persistent vomiting and since then unable to tolerate PO food. She is now s/p esophagectomy with pyloromyotomy and feeding jejunostomy. Overnight she was agitated requiring Haldol and 1:1 observation and went into afib.     PAST MEDICAL & SURGICAL HISTORY:  Atrial fibrillation  Achalasia  Hypertension  Hypothyroidism  History of repair of hiatal hernia  H/O colectomy  S/P appendectomy  S/P hysterectomy    FAMILY HISTORY:  Unremarkable    Social History:  Denies smoking, illicit drug use or frequent alcohol consumption    Allergies  opioid-like analgesics (Other)    MEDICATIONS  (STANDING):  dexMEDEtomidine Infusion 0.05 MICROgram(s)/kG/Hr (0.556 mL/Hr) IV Continuous <Continuous>  heparin  Injectable 5000 Unit(s) SubCutaneous every 12 hours  levothyroxine Injectable 68 MICROGram(s) IV Push at bedtime  metoprolol tartrate Injectable 5 milliGRAM(s) IV Push every 6 hours  OLANZapine Disintegrating Tablet 2.5 milliGRAM(s) Oral at bedtime  pantoprazole  Injectable 40 milliGRAM(s) IV Push daily  phenylephrine    Infusion 0.5 MICROgram(s)/kG/Min (8.344 mL/Hr) IV Continuous <Continuous>    MEDICATIONS  (PRN):  HYDROmorphone  Injectable 0.25 milliGRAM(s) IV Push every 3 hours PRN Severe Pain (7 - 10)  naloxone Injectable 0.1 milliGRAM(s) IV Push every 3 minutes PRN For ANY of the following changes in patient status:  A. RR LESS THAN 10 breaths per minute, B. Oxygen saturation LESS THAN 90%, C. Sedation score of 6  ondansetron Injectable 4 milliGRAM(s) IV Push every 6 hours PRN Nausea    Review of Systems:  Constitutional:  Negative for weight change, fever, malaise  HEENT:  Negative for sinus pain, hoarseness, sore throat, dysphagia, vision changes  Cardiovascular:  Negative for chest pain, palpitations, dizziness  Respiratory:  Negative for cough, wheezing, dyspnea  Gastrointestinal:  Negative for nausea, vomiting, diarrhea, melena  Musculoskeletal:  Negative for pain, swelling, stiffness   Neuro:  Negative for weakness, numbness, headache  Psych:  Negative for anxiety, depression  Endocrine:  Negative for polyuria, polydipsia, temperature Intolerance    All other systems negative unless otherwise stated    ICU Vital Signs Last 24 Hrs  T(C): 36.3 (28 Oct 2019 05:00), Max: 36.4 (27 Oct 2019 08:00)  T(F): 97.4 (28 Oct 2019 05:00), Max: 97.5 (27 Oct 2019 08:00)  HR: 54 (28 Oct 2019 06:00) (45 - 81)  BP: 167/74 (28 Oct 2019 06:00) (95/55 - 198/98)  BP(mean): 98 (28 Oct 2019 06:00) (60 - 122)  ABP: --  ABP(mean): --  RR: 15 (28 Oct 2019 06:00) (13 - 24)  SpO2: 99% (28 Oct 2019 06:00) (96% - 100%)    Daily     Daily Weight in k (28 Oct 2019 05:00)  I&O's Summary    26 Oct 2019 07:  -  27 Oct 2019 07:00  --------------------------------------------------------  IN: 917.8 mL / OUT: 505 mL / NET: 412.8 mL    27 Oct 2019 07:01  -  28 Oct 2019 06:54  --------------------------------------------------------  IN: 872.3 mL / OUT: 1410 mL / NET: -537.7 mL    Physical Exam:  Gen: Alert, no apparent distress  CV: Bradycardic, regular rhythm; no murmurs, rubs or gallops  Pulm: Clear to auscultation bilaterally, no rales, rhonchi or wheezes  Chest: Chest tubes/drains in place with dressings clean, dry and intact  GI: Abd is soft, non-tender and non-distended with +BS  Ext: No clubbing or cyanosis; 1+ bilateral lower extremity pitting edema  Neuro: A+Ox1 (self), follows commands and moves all extremities    Labs:                          9.3    6.56  )-----------( 184      ( 28 Oct 2019 04:15 )             28.5       10-    138  |  101  |  18  ----------------------------<  131<H>  3.9   |  26  |  0.49<L>    Ca    8.5      28 Oct 2019 04:15  Phos  2.5     10-  Mg     2.1     10-28    TPro  6.1  /  Alb  3.0<L>  /  TBili  0.7  /  DBili  x   /  AST  20  /  ALT  12  /  AlkPhos  63  10-26    Assessment/Plan: 89 y/o female w/ PMH of hypertension, achalasia s/p Botox injection and dilation x2 with Heller myotomy, hiatal hernia s/p repair w/ mesh, emergency volvulus with surgical repair 2014 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    Neuro:   Pain control with Oxycodone / Tylenol IV   PCEA removed  Haldol, 1:1 observation for agitation                                          Cardiovascular:    Sinus bradycardia currently; intermittently afib with RVR, on Lopressor  Continue hemodynamic monitoring    Respiratory:  Pt is comfortable on nasal cannula  Encourage incentive spirometry    GI:  NPO on feeds at 20 cc/hr; advancing slowly to goal at 50 cc/hr  Continue Protonix  Continue Zofran for nausea - PRN	          Renal:  CHF history, edema on exam, diuresing with Lasix  Monitor I/Os and electrolytes    Hematologic / Oncology:  No signs of active bleeding, H/H stable  HSQ for DVT ppl    Infectious disease:  All surgical incision / chest tube sites look clean  No fever or other signs of infection     Endocrine:  Continue Accuchecks with coverage  Continue Synthroid    All clinical, lab, hemodynamic and radiographic data were reviewed and the plan was discussed with CTICU team.     Axel Pena MD

## 2021-04-11 NOTE — CONSULTS
Internal Medicine Initial Visit      Rajiv Rodriguez MRN# [id number]   YOB: 1955 Age: 65 year old   Date of Admission: 4/10/2021  PCP: Semmler, Steven Duane    Referring Provider: Behavioral Health - No admitting provider for patient encounter.  Reason for Visit: General Medical Evaluation          Assessment and Recommendations:   Rajiv Rodriguez is a 65 year old year old male with a history of paranoid schizophrenia, CVA 2012, and esophageal stricture s/p dilation who is admitted to station 3B with auditory hallucinations. Internal Medicine consultation was ordered for general medical evaluation       Psychosis  Paranoid schizophrenia  Per ED note, patient endorsed auditory hallucinations x 1 week during which time he has also had insomnia. Stopped taking medications 3.5 months ago then restarted after psychiatry appointment 4 days ago.  -Management per psychiatry team.     Hyperlipidemia  -continue statin    Hx CVA (2012)  -continue on pta plavix and aspirin    GERD  Dysphagia, mild  Follows modified diet given dysphagia which has been worked up outpatient.   -follow pta soft food diet  -continue pta omeprazole    Medicine will sign off, please page with any additional concerns.     Meghna Russell  Hospitalist Service   Pager: [phone]  7a-6p M-F and 7a-3p weekends/holidays, through 4/11   Otherwise page job code [phone] (), [phone] (3A), or [phone] ([facility] and )  Text paging via [employer] is appreciated.   Reason for Admission:   Acute exacerbation of paranoid schizophrenia         History of Present Illness:   History is obtained from the patient and medical record.     Rajiv Rodriguez is a 65 year old year old male with a history as listed above who is admitted to behavioral health for exacerbation of paranoid schizophrenia.    Internal Medicine service was asked to see patient for a general medication evaluation. Currently, patient is resting quietly in his room. He has no medical complaints.  He is pleasant and cooperative. Denies any chest pain, shortness of breath. Denies medical problems in general with exception of previous stroke for which he is on chronic anticoagulation. He denies any lasting deficits associated with prior CVA.          Review of Systems:   Constitutional: negative for fever/chills or recent weight changes   Eyes: negative for vision changes   Ears/Nose/Throat: negative for ear pain, sore throat, nasal or sinus congestion   Cardiovascular: negative for chest pain or palpitations   Respiratory: negative for cough or shortness of breath   Gastrointestinal: negative for abdominal pain, N/V, diarrhea. Positive for intermittent constipation  Genitourinary: negative for dysuria, urinary urgency or frequency. Mentions previous procedure for ureteral stricture   Musculoskeletal: negative for joint pain   Neurologic: negative for headaches or numbness, tingling, weakness of extremities. States he is 'forgetful' and worries about Alzheimer's because his mother and father both had this   Skin: negative for rashes or wounds           Past Medical History:   Reviewed and updated in Epic.  Past Medical History:   Diagnosis Date     Acute exacerbation of chronic paranoid schizophrenia (H) 3/1/2016     Esophageal stricture 08/28/2018    Esophageal stricture 08/28/2018, s/p dilation     Stroke (H) 11/23/2012    symptoms of sudden unsteadiness and hoarseness of voice. 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             Past Surgical History:   Reviewed and updated in Epic.  Past Surgical History:   Procedure Laterality Date     DILATE ESOPHAGUS  08/2017    ESOPHAGEAL DILATION     PROSTATE SURGERY  08/2016    LASER OF PROSTATE W/ GREEN LIGHT PVP             Social History:     Social History     Tobacco Use     Smoking status: Never Smoker     Smokeless tobacco: Never Used   Substance Use Topics  "    Alcohol use: No     Drug use: No             Family History:   Reviewed and updated in Epic.  Family History   Problem Relation Age of Onset     Alzheimer Disease Mother      Cancer Father         skin             Allergies:   No Known Allergies          Medications:     Medications Prior to Admission   Medication Sig Dispense Refill Last Dose     ARIPiprazole (ABILIFY) 20 MG tablet Take 20 mg by mouth daily        aspirin 81 MG EC tablet Take 81 mg by mouth daily        atorvastatin (LIPITOR) 40 MG tablet Take 1 tablet (40 mg) by mouth daily 30 tablet 0      clopidogrel (PLAVIX) 75 MG tablet Take 75 mg by mouth daily        haloperidol (HALDOL) 5 MG tablet Take 5 mg by mouth 2 times daily        omeprazole (PRILOSEC) 20 MG DR capsule Take 20 mg by mouth daily           Current Facility-Administered Medications   Medication     acetaminophen (TYLENOL) tablet 650 mg     ARIPiprazole (ABILIFY) tablet 20 mg     aspirin EC tablet 81 mg     atorvastatin (LIPITOR) tablet 40 mg     clopidogrel (PLAVIX) tablet 75 mg     diphenhydrAMINE (BENADRYL) capsule 50 mg     haloperidol (HALDOL) tablet 5 mg     omeprazole (priLOSEC) CR capsule 20 mg     traZODone (DESYREL) tablet 50 mg            Physical Exam:   Blood pressure 123/73, pulse 65, temperature 97.5  F (36.4  C), temperature source Oral, resp. rate 16, height 1.778 m (5' 10\"), weight 79 kg (174 lb 3.2 oz), SpO2 100 %.  Body mass index is 25 kg/m .  GENERAL: Alert and oriented x 3.   HEENT: Anicteric sclera. Mucous membranes moist.   CV: RRR. S1, S2. No murmurs appreciated.   RESPIRATORY: Effort normal on room air. Lungs CTAB with no wheezing, rales, rhonchi.   GI: Abdomen soft, non distended, non tender. No guarding, rigidity or rebound tenderness.  MUSCULOSKELETAL: No joint swelling or tenderness.  NEUROLOGICAL: No focal deficits. Moves all extremities.   EXTREMITIES: No peripheral edema. Intact bilateral pedal pulses.   SKIN: No jaundice. No rashes.           Data: "   CBC:  Recent Labs   Lab Test 04/11/21 0209   WBC 6.7   RBC 5.40   HGB 14.8   HCT 46.1   MCV 85   MCH 27.4   MCHC 32.1   RDW 14.0          CMP:  Recent Labs   Lab Test 04/11/21 0209 03/02/16 0719 03/02/16 0719      < > 139   POTASSIUM 4.4   < > 4.3   CHLORIDE 109   < > 108   LEROY 8.6   < > 8.9   CO2 24   < > 27   BUN 22   < > 18   CR 0.95   < > 0.93   *   < > 105*   AST  --   --  23   ALT  --   --  21   BILITOTAL  --   --  0.6   ALBUMIN  --   --  3.5   PROTTOTAL  --   --  6.5*   ALKPHOS  --   --  107    < > = values in this interval not displayed.       TSH:  TSH   Date Value Ref Range Status   03/02/2016 1.75 0.40 - 4.00 mU/L Final       Tox screen: negative    Unresulted Labs Ordered in the Past 30 Days of this Admission     No orders found for last 31 day(s).

## 2021-04-11 NOTE — H&P
Admitted:     04/10/2021      IDENTIFYING INFORMATION:  The patient is a 65-year-old  male.  He is retired.  [place].      CHIEF COMPLAINT:  Hallucinations.      HISTORY OF PRESENT ILLNESS:  The patient has a complex psychiatric history of paranoid psychosis, schizophrenia, history of stroke in 2012.  He came to the emergency room because he has been having increasing auditory hallucinations.  He stopped taking his medications 3 months ago and restarted them, but the voices have been increasing.  He is not able to sleep.  He made an appointment with his psychiatrist Tuesday and was restarted on his medications and Abilify injection was added, but he has not yet received it.  Because he is not able to sleep and he was pacing, he came to the emergency room.  He experiences following symptoms:  He has auditory hallucinations.  These are accusing him of past deeds, very disruptive.  It impacts his sleep  is restless with him.  They are not command in nature now, but they were in command in nature in the past.  He has thought insertion, but denies any thought broadcasting or thought deletion.  He has delusions of persecutions, delusions of control, but no delusions of reference.      PSYCHIATRIC REVIEW OF SYSTEMS:  The patient denies any symptoms of cailin, denies any symptoms of depression, denies any symptoms of anxiety, denies any PTSD, denies any alcohol or drug use.  He denies any suicidal or homicidal ideation, plan or intent.      PAST PSYCHIATRIC HISTORY:  He was psychiatrically hospitalized twice, once in 2014 and 2016, mostly with similar presentations.  He has tried in the past other medications including Abilify, Risperdal, Depakote.      PAST MEDICAL HISTORY:  A 10-point review systems was reviewed and is negative.      VITAL SIGNS:  Blood pressure 123/73, pulse of 65, temperature is 97.4, respiratory rate of 16.      FAMILY HISTORY:  Denied any family psychiatric or  chemical dependency history.      REVIEW OF SYSTEMS:  A 10-point review systems was reviewed and negative except as below.      ALLERGIES:  NO KNOWN ALLERGIES.      SOCIAL HISTORY:  [place]  He lives with his sister.  He has a Bachelor of Science.  He worked in a "[employer] yard.      MENTAL STATUS EXAMINATION:  The patient is a 64-year-old  male who appears his stated age.  He has adequate grooming, adequate hygiene.  He is cooperative, good eye contact.  Mood is described as okay.  Affect is congruent.  Speech is spontaneous, normal rate.  Linear thought process.  No psychomotor abnormalities.  Normal gait.  Linear thought processes, no loose associations.  Has auditory hallucinations, delusions of persecutions, delusions of reference.  Insight and judgment are partial.  Alert, oriented x3.  Attention, concentration is intact.  Recent and remote memory intact.  Language and fund of knowledge intact.  Global impression score, first is 7, most recently is 7.      ASSESSMENT AND pLAN:  The patient is a 65-year-old male with an unknown biological predisposition currently experiencing psychosis with auditory hallucinations, delusions with poor sleep.  He was recently noncompliant with medications.  He needs to be hospitalized for safety, stabilization and possible adjustment of his medications.      DISCHARGE DIAGNOSIS:   Schizophrenia.      PLAN:   1.  The patient will be placed on suicide precautions.  We will obtain collateral information.  We will order routine labs, CBC, CMP, TSH.  We will order regular labs including Abilify for him 20 mg, aspirin 81 mg, Plavix 75 mg, Haldol has been ordered newly.  He will be continued on that  5 mg twice a day and atorvastatin, omeprazole 5 mg.  We will be also added Benadryl to help prevent side effects of Haldol 5 mg scheduled at bedtime.  The patient will be seen by Internal Medicine.  The patient will be seen by case management, rest of  management as per primary treatment team who resumes care on Monday.         RACHEL ESCALANTE MD             D: 2021   T: 2021   MT:       Name:     SANDRO SAUCEDA   MRN:      [id number]        Account:      [id number]   :      1955        Admitted:     04/10/2021                   Document: [id number]

## 2021-04-11 NOTE — ED NOTES
ED to Behavioral Floor Handoff    SITUATION  Rajiv Rodriguez is a 65 year old male who speaks English and lives in a home with family members The patient arrived in the ED by private car from home with a complaint of Hallucinations (pt has increse in hearing voices and rinnging in ears, now unable to sleep. has recent med changes) and Insomnia  .The patient's current symptoms started/worsened 1 day(s) ago and during this time the symptoms have increased.   In the ED, pt was diagnosed with   Final diagnoses:   Psychosis, unspecified psychosis type (H)        Initial vitals were: BP: 127/83  Pulse: 72  Temp: 97.6  F (36.4  C)  Resp: 16  Weight: 81.5 kg (179 lb 9.6 oz)  SpO2: 98 %   --------  Is the patient diabetic? No   If yes, last blood glucose? --     If yes, was this treated in the ED? --  --------  Is the patient inebriated (ETOH) No or Impaired on other substances? No  MSSA done? N/A  Last MSSA score: --    Were withdrawal symptoms treated? N/A  Does the patient have a seizure history? No. If yes, date of most recent seizure--  --------  Is the patient patient experiencing suicidal ideation? denies current or recent suicidal ideation     Homicidal ideation? denies current or recent homicidal ideation or behaviors.    Self-injurious behavior/urges? denies current or recent self injurious behavior or ideation.  ------  Was pt aggressive in the ED No  Was a code called No  Is the pt now cooperative? Yes  -------  Meds given in ED:   Medications   ARIPiprazole (ABILIFY) tablet 20 mg (20 mg Oral Given 4/11/21 0816)   aspirin EC tablet 81 mg (81 mg Oral Given 4/11/21 0816)   atorvastatin (LIPITOR) tablet 40 mg (40 mg Oral Given 4/11/21 0816)   clopidogrel (PLAVIX) tablet 75 mg (75 mg Oral or NG Tube Given 4/11/21 0816)   haloperidol (HALDOL) tablet 5 mg (5 mg Oral Given 4/11/21 0816)   omeprazole (priLOSEC) CR capsule 20 mg (20 mg Oral Given 4/11/21 0816)   diphenhydrAMINE (BENADRYL) capsule 50 mg (50 mg Oral Given  4/11/21 0223)      Family present during ED course? Yes  Family currently present? No    BACKGROUND  Does the patient have a cognitive impairment or developmental disability? No  Allergies: No Known Allergies.   Social demographics are   Social History     Socioeconomic History     Marital status: Single     Spouse name: Not on file     Number of children: Not on file     Years of education: Not on file     Highest education level: Not on file   Occupational History     Occupation: Not emplyed     Comment: [employer]   Social Needs     Financial resource strain: Not on file     Food insecurity     Worry: Not on file     Inability: Not on file     Transportation needs     Medical: Not on file     Non-medical: Not on file   Tobacco Use     Smoking status: Never Smoker     Smokeless tobacco: Never Used   Substance and Sexual Activity     Alcohol use: No     Drug use: No     Sexual activity: Not on file   Lifestyle     Physical activity     Days per week: Not on file     Minutes per session: Not on file     Stress: Not on file   Relationships     Social connections     Talks on phone: Not on file     Gets together: Not on file     Attends Anglican service: Not on file     Active member of club or organization: Not on file     Attends meetings of clubs or organizations: Not on file     Relationship status: Not on file     Intimate partner violence     Fear of current or ex partner: Not on file     Emotionally abused: Not on file     Physically abused: Not on file     Forced sexual activity: Not on file   Other Topics Concern     Not on file   Social History Narrative     Not on file        ASSESSMENT  Labs results   Labs Ordered and Resulted from Time of ED Arrival Up to the Time of Departure from the ED   BASIC METABOLIC PANEL - Abnormal; Notable for the following components:       Result Value    Glucose 100 (*)     All other components within normal limits   DRUG ABUSE SCREEN 6 CHEM DEP URINE ([facility])    INFLUENZA A/B & SARS-COV2 PCR MULTIPLEX   CBC WITH PLATELETS DIFFERENTIAL      Imaging Studies: No results found for this or any previous visit (from the past 24 hour(s)).   Most recent vital signs /85   Pulse 60   Temp 97.8  F (36.6  C) (Oral)   Resp 18   Wt 81.5 kg (179 lb 9.6 oz)   SpO2 98%   BMI 26.79 kg/m     Abnormal labs/tests/findings requiring intervention:---   Pain control: pt had none  Nausea control: pt had none    RECOMMENDATION  Are any infection precautions needed (MRSA, VRE, etc.)? No If yes, what infection? --  ---  Does the patient have mobility issues? independently. If yes, what device does the pt use? ---  ---  Is patient on 72 hour hold or commitment? No If on 72 hour hold, have hold and rights been given to patient? N/A  Are admitting orders written if after 10 p.m. ?N/A  Tasks needing to be completed:---     Phoebe Mueller RN   [facility]--    [phone] [place] [facility]   [phone] [facility]

## 2021-04-11 NOTE — PLAN OF CARE
"Pt is a pleasant 64 y/o male admitted to  for auditory hallucinations resulting in lack of sleep for past several days. Pt had been off his psych medications for approximately 3 months until about 4 days ago when he was prescribed Abilify 20 mg and Haldol 5 mg (states this is a new medication for him). Pt has had no improvement since starting these medications. Pt reports that auditory hallucinations are condescending in nature (telling him he's useless/worthless), though while interviewing patient, he reports only hearing \"ringing\". Voices are not command in nature. Pt reports he heard the voices only a couple of times this morning while in the ED. Pt's main stressor currently is inability to sleep d/t the voices/ringing in his head. Denies SI/depression/anxiety/VH. Pt mentions mental state is \"tired\" several times. Denies personal or family history of suicide. Pt has hx of 2 prior  admissions and hx of one civil commitment. Pt resides in a home with his sister who is supportive. Pt reports he does not have an Advance Directive but would like to meet with [facility] regarding this. Pt signed in voluntarily and has remained calm and cooperative thus far. Napped for a couple of hours after lunch.    Pt's vital signs are stable. Ambulates independently and is independent with all ADLs. Reports he's been eating well and his appetite has been good. Pt reports difficulty swallowing some solid foods (stating \"it gets stuck\"). Pt had a swallow study completed recently which found Schatzki's ring. Pt reports he knows his limitations with which foods to eat and prefers a regular diet while here and will choose soft foods from this menu that he can tolerate (doesn't have his dentures with him in hospital either). Encouraged pt to let staff know if he changes his mind and would like a soft diet. Ate 100% for lunch. Hx of CVA in 2012, prescribed Plavix 75 mg and Aspirin 81 mg. Pt also has GERD and takes omeprazole 20 mg.   "

## 2021-04-11 NOTE — PROGRESS NOTES
04/11/21 1118   Patient Belongings   Did you bring any home meds/supplements to the hospital?  No   Patient Belongings   (See notes)   Belongings Search Yes   Clothing Search Yes   Second Staff Edgar DUVAL     With Pt: Glasses    Locker: Baseball cap, watch, white t-shirt, grey shirt, bandana, underwear, socks, jeans, belt, cellphone+quintana, boots, coat.    Security: None.    A               Admission:  I am responsible for any personal items that are not sent to the safe or pharmacy.  [facility] is not responsible for loss, theft or damage of any property in my possession.    Signature:  _________________________________ Date: _______  Time: _____                                              Staff Signature:  ____________________________ Date: ________  Time: _____      2nd Staff person, if patient is unable/unwilling to sign:    Signature: ________________________________ Date: ________  Time: _____     Discharge:  [facility] has returned all of my personal belongings:    Signature: _________________________________ Date: ________  Time: _____                                          Staff Signature:  ____________________________ Date: ________  Time: _____

## 2021-04-11 NOTE — ED PROVIDER NOTES
[facility] ED Mental Health Handoff Note:       Brief HPI:  This is a 65 year old male signed out to me by Dr. Cardona.  See initial ED Provider note for full details of the presentation. Interval history is pertinent for no overnight events.    Home meds reviewed and ordered/administered: Yes    Medically stable for inpatient mental health admission: Yes.    Evaluated by mental health: Yes. The recommendation is for inpatient mental health treatment. Bed search in process    Safety concerns: At the time I received sign out, there were no safety concerns.    Hold Status:  Active Orders   N/A         Exam:   Patient Vitals for the past 24 hrs:   BP Temp Temp src Pulse Resp SpO2 Weight   04/11/21 0633 118/75 96  F (35.6  C) Oral 58 16 98 % --   04/10/21 2115 127/83 97.5  F (36.4  C) Oral 70 16 100 % 81.5 kg (179 lb 9.6 oz)   04/10/21 2106 -- 97.6  F (36.4  C) Oral 72 16 98 % --           ED Course:    Medications   ARIPiprazole (ABILIFY) tablet 20 mg (has no administration in time range)   aspirin EC tablet 81 mg (has no administration in time range)   atorvastatin (LIPITOR) tablet 40 mg (has no administration in time range)   clopidogrel (PLAVIX) tablet 75 mg (has no administration in time range)   haloperidol (HALDOL) tablet 5 mg (has no administration in time range)   omeprazole TBEC 20 mg (has no administration in time range)   diphenhydrAMINE (BENADRYL) capsule 50 mg (50 mg Oral Given 4/11/21 0223)            There were no significant events during my shift.    Patient was signed out to the oncoming provider, Dr. Sierra      Impression:    ICD-10-CM    1. Psychosis, unspecified psychosis type (H)  F29 CBC with platelets differential     Basic metabolic panel       Plan:    1. Admitted/transferred to inpatient mental health bed.      RESULTS:   Results for orders placed or performed during the hospital encounter of 04/10/21 (from the past 24 hour(s))   Drug abuse screen 6 urine (chem dep)     Status: None     Collection Time: 04/11/21 12:37 AM   Result Value Ref Range    Amphetamine Qual Urine Negative NEG^Negative    Barbiturates Qual Urine Negative NEG^Negative    Benzodiazepine Qual Urine Negative NEG^Negative    Cannabinoids Qual Urine Negative NEG^Negative    Cocaine Qual Urine Negative NEG^Negative    Ethanol Qual Urine Negative NEG^Negative    Opiates Qualitative Urine Negative NEG^Negative   Asymptomatic Influenza A/B & SARS-CoV2 (COVID-19) Virus PCR Multiplex     Status: None    Collection Time: 04/11/21  1:50 AM    Specimen: Nasopharyngeal   Result Value Ref Range    Flu A/B & SARS-COV-2 PCR Source Nasopharyngeal     SARS-CoV-2 PCR Result NEGATIVE     Influenza A PCR Negative NEG^Negative    Influenza B PCR Negative NEG^Negative    Respiratory Syncytial Virus PCR (Note)     Flu A/B & SARS-CoV-2 PCR Comment (Note)    CBC with platelets differential     Status: None    Collection Time: 04/11/21  2:09 AM   Result Value Ref Range    WBC 6.7 4.0 - 11.0 10e9/L    RBC Count 5.40 4.4 - 5.9 10e12/L    Hemoglobin 14.8 13.3 - 17.7 g/dL    Hematocrit 46.1 40.0 - 53.0 %    MCV 85 78 - 100 fl    MCH 27.4 26.5 - 33.0 pg    MCHC 32.1 31.5 - 36.5 g/dL    RDW 14.0 10.0 - 15.0 %    Platelet Count 189 150 - 450 10e9/L    Diff Method Automated Method     % Neutrophils 61.3 %    % Lymphocytes 23.3 %    % Monocytes 9.1 %    % Eosinophils 4.9 %    % Basophils 1.0 %    % Immature Granulocytes 0.4 %    Nucleated RBCs 0 0 /100    Absolute Neutrophil 4.1 1.6 - 8.3 10e9/L    Absolute Lymphocytes 1.6 0.8 - 5.3 10e9/L    Absolute Monocytes 0.6 0.0 - 1.3 10e9/L    Absolute Eosinophils 0.3 0.0 - 0.7 10e9/L    Absolute Basophils 0.1 0.0 - 0.2 10e9/L    Abs Immature Granulocytes 0.0 0 - 0.4 10e9/L    Absolute Nucleated RBC 0.0    Basic metabolic panel     Status: Abnormal    Collection Time: 04/11/21  2:09 AM   Result Value Ref Range    Sodium 140 133 - 144 mmol/L    Potassium 4.4 3.4 - 5.3 mmol/L    Chloride 109 94 - 109 mmol/L    Carbon  Dioxide 24 20 - 32 mmol/L    Anion Gap 7 3 - 14 mmol/L    Glucose 100 (H) 70 - 99 mg/dL    Urea Nitrogen 22 7 - 30 mg/dL    Creatinine 0.95 0.66 - 1.25 mg/dL    GFR Estimate 83 >60 mL/min/[1.73_m2]    GFR Estimate If Black >90 >60 mL/min/[1.73_m2]    Calcium 8.6 8.5 - 10.1 mg/dL             Arthur Larson, DO Larson, Arthur Raphael DO  04/11/21 0652

## 2021-04-11 NOTE — ED NOTES
Pt has been calm and cooperative, aware that he is being admitted to 3 B after 8am.  Pt continues to deny being SI/HI. + auditory hallucinations, denies its command hallucination.   Pt steady on his feet, VSS.

## 2021-04-11 NOTE — ED PROVIDER NOTES
[facility] EMERGENCY DEPARTMENT ([facility])  4/10/21  History     Chief Complaint   Patient presents with     Hallucinations     pt has increse in hearing voices and rinnging in ears, now unable to sleep. has recent med changes     Insomnia     The history is provided by the patient and medical records.     Rajiv Rodriguez is a 65 year old male with a history of CVA and paranoid schizophrenia who presents for evaluation of hallucinations and insomnia.  Patient endorses auditory hallucinations which have been constant for the past 7 days.  He states these voices are accusing him of past events and have caused him great distress.  Patient also notes he has had difficulty sleeping over the past week due to his hallucinations.  He denies suicidal or homicidal ideation.  Patient reportedly stopped taking his medication 3.5 months ago but after attending an appointment with his psychiatrist on Tuesday, 4 days ago, he was restarted on his medications and had an Abilify injection added to his medication list.  Patient does have a history of civil commitment but is not currently committed.    This part of the document was transcribed by Brian Pacheco Medical Scribe.    I have reviewed the Medications, Allergies, Past Medical and Surgical History, and Social History in the [employer] system.  PAST MEDICAL HISTORY:   Past Medical History:   Diagnosis Date     Acute exacerbation of chronic paranoid schizophrenia (H) 3/1/2016     Esophageal stricture 08/28/2018    Esophageal stricture 08/28/2018, s/p dilation     Stroke (H) 11/23/2012    symptoms of sudden unsteadiness and hoarseness of voice. 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       PAST SURGICAL HISTORY:   Past Surgical History:   Procedure Laterality Date     DILATE ESOPHAGUS  08/2017    ESOPHAGEAL DILATION     PROSTATE SURGERY  08/2016    LASER OF PROSTATE ROSELIA/ GREEN  LIGHT PVP       Past medical history, past surgical history, medications, and allergies were reviewed with the patient. Additional pertinent items: None    FAMILY HISTORY:   Family History   Problem Relation Age of Onset     Alzheimer Disease Mother      Cancer Father         skin       SOCIAL HISTORY:   Social History     Tobacco Use     Smoking status: Never Smoker     Smokeless tobacco: Never Used   Substance Use Topics     Alcohol use: No     Social history was reviewed with the patient. Additional pertinent items: None      Patient's Medications   New Prescriptions    No medications on file   Previous Medications    ARIPIPRAZOLE (ABILIFY) 20 MG TABLET    Take 20 mg by mouth    ASPIRIN (ASA) 325 MG TABLET    Take 1 tablet (325 mg) by mouth daily Start after 21 days of PLAVIX    ATORVASTATIN (LIPITOR) 40 MG TABLET    Take 1 tablet (40 mg) by mouth daily    CLOPIDOGREL (PLAVIX) 75 MG TABLET    1 tablet (75 mg) by Oral or NG Tube route daily for 21 days    HALOPERIDOL (HALDOL) 1 MG TABLET    Take 5 mg by mouth    OMEPRAZOLE 20 MG TABLET    Take 20 mg by mouth   Modified Medications    No medications on file   Discontinued Medications    OLANZAPINE (ZYPREXA) 5 MG TABLET    Take 1 tablet (5 mg) by mouth At Bedtime    OLANZAPINE (ZYPREXA) 5 MG TABLET    Take 1 tablet (5 mg) by mouth At Bedtime        No Known Allergies     Review of Systems   Psychiatric/Behavioral: Positive for hallucinations. Negative for suicidal ideas.     A complete review of systems was performed with pertinent positives and negatives noted in the HPI, and all other systems negative.    Physical Exam   BP: 127/83  Pulse: 72  Temp: 97.6  F (36.4  C)  Resp: 16  Weight: 81.5 kg (179 lb 9.6 oz)  SpO2: 98 %      Physical Exam  Vitals signs and nursing note reviewed.   Constitutional:       General: He is not in acute distress.     Appearance: Normal appearance. He is not diaphoretic.   HENT:      Head: Atraumatic.   Eyes:      General: No scleral  icterus.     Pupils: Pupils are equal, round, and reactive to light.   Cardiovascular:      Rate and Rhythm: Normal rate and regular rhythm.      Heart sounds: Normal heart sounds.   Pulmonary:      Effort: No respiratory distress.      Breath sounds: Normal breath sounds.   Abdominal:      General: Bowel sounds are normal.      Palpations: Abdomen is soft.      Tenderness: There is no abdominal tenderness.   Musculoskeletal:         General: No tenderness.   Skin:     General: Skin is warm.      Findings: No rash.   Neurological:      General: No focal deficit present.      Mental Status: He is alert and oriented to person, place, and time.   Psychiatric:         Mood and Affect: Mood normal.         Behavior: Behavior normal.         ED Course        Procedures                           No results found for this or any previous visit (from the past 24 hour(s)).  Medications - No data to display          Assessments & Plan (with Medical Decision Making)     65 year old male with a history of CVA and paranoid schizophrenia who presents for evaluation of hallucinations and insomnia.  Patient endorses auditory hallucinations which have been constant for the past 7 days.  Patient seen in coordination with behavioral crisis , agree with formulation the patient is at high for further deterioration on outpatient basis, arrangements for voluntary inpatient stabilization made accordingly.  Patient given Benadryl 50 mg p.o. as a sleep aid.    I have reviewed the nursing notes.    I have reviewed the findings, diagnosis, plan and need for follow up with the patient.    New Prescriptions    No medications on file       Final diagnoses:   Psychosis, unspecified psychosis type (H)       4/10/2021   [facility] EMERGENCY DEPARTMENT     Arie Cardona MD  04/11/21 [id number]

## 2021-04-11 NOTE — PHARMACY-ADMISSION MEDICATION HISTORY
Admission Medication History Completed by Pharmacy    See [facility] Admission Navigator for allergy information, preferred outpatient pharmacy, prior to admission medications and immunization status.     Medication History Sources:     Patient    Surescripts    Changes made to PTA medication list (reason):    Added: None    Deleted: None    Changed: None    Additional Information:    Patient was scheduled to start the Abilify injection 400 mg every 4 weeks on 4/8/21 but has not yet started.    Prior to Admission medications    Medication Sig Last Dose Taking? Auth Provider   ARIPiprazole (ABILIFY) 20 MG tablet Take 20 mg by mouth daily 4/11/2021 at Unknown time Yes Unknown, Entered By History   aspirin 81 MG EC tablet Take 81 mg by mouth daily 4/11/2021 at Unknown time Yes Unknown, Entered By History   atorvastatin (LIPITOR) 40 MG tablet Take 1 tablet (40 mg) by mouth daily 4/11/2021 at Unknown time Yes Dipesh Jenkins MD   clopidogrel (PLAVIX) 75 MG tablet Take 75 mg by mouth daily 4/11/2021 at Unknown time Yes Unknown, Entered By History   haloperidol (HALDOL) 5 MG tablet Take 5 mg by mouth 2 times daily 4/11/2021 x1 at Unknown time Yes Unknown, Entered By History   omeprazole (PRILOSEC) 20 MG DR capsule Take 20 mg by mouth daily 4/11/2021 at Unknown time Yes Unknown, Entered By History       Date completed: 04/11/21    Medication history completed by: Kristi Ambrocio

## 2021-04-11 NOTE — ED NOTES
ED to Behavioral Floor Handoff    SITUATION  Rajiv Rodriguez is a 65 year old male who speaks English and lives in a home with family members The patient arrived in the ED by private car from home with a complaint of Hallucinations (pt has increse in hearing voices and rinnging in ears, now unable to sleep. has recent med changes) and Insomnia  .The patient's current symptoms started/worsened 1 week(s) ago and during this time the symptoms have increased.   In the ED, pt was diagnosed with   Final diagnoses:   None        Initial vitals were: BP: 127/83  Pulse: 72  Temp: 97.6  F (36.4  C)  Resp: 16  Weight: 81.5 kg (179 lb 9.6 oz)  SpO2: 98 %   --------  Is the patient diabetic? No   If yes, last blood glucose? --     If yes, was this treated in the ED? --  --------  Is the patient inebriated (ETOH) No or Impaired on other substances? No  MSSA done? N/A  Last MSSA score: --    Were withdrawal symptoms treated? N/A  Does the patient have a seizure history? No. If yes, date of most recent seizure--  --------  Is the patient patient experiencing suicidal ideation? denies current or recent suicidal ideation     Homicidal ideation? denies current or recent homicidal ideation or behaviors.    Self-injurious behavior/urges? denies current or recent self injurious behavior or ideation.  ------  Was pt aggressive in the ED No  Was a code called No  Is the pt now cooperative? Yes  -------  Meds given in ED: Medications - No data to display   Family present during ED course? Yes  Family currently present? Yes    BACKGROUND  Does the patient have a cognitive impairment or developmental disability? No  Allergies: No Known Allergies.   Social demographics are   Social History     Socioeconomic History     Marital status: Single     Spouse name: Not on file     Number of children: Not on file     Years of education: Not on file     Highest education level: Not on file   Occupational History     Occupation: Not emplyed     Comment:  [employer]   Social Needs     Financial resource strain: Not on file     Food insecurity     Worry: Not on file     Inability: Not on file     Transportation needs     Medical: Not on file     Non-medical: Not on file   Tobacco Use     Smoking status: Never Smoker     Smokeless tobacco: Never Used   Substance and Sexual Activity     Alcohol use: No     Drug use: No     Sexual activity: Not on file   Lifestyle     Physical activity     Days per week: Not on file     Minutes per session: Not on file     Stress: Not on file   Relationships     Social connections     Talks on phone: Not on file     Gets together: Not on file     Attends Jewish service: Not on file     Active member of club or organization: Not on file     Attends meetings of clubs or organizations: Not on file     Relationship status: Not on file     Intimate partner violence     Fear of current or ex partner: Not on file     Emotionally abused: Not on file     Physically abused: Not on file     Forced sexual activity: Not on file   Other Topics Concern     Not on file   Social History Narrative     Not on file        ASSESSMENT  Labs results Labs Ordered and Resulted from Time of ED Arrival Up to the Time of Departure from the ED - No data to display   Imaging Studies: No results found for this or any previous visit (from the past 24 hour(s)).   Most recent vital signs /83   Pulse 72   Temp 97.6  F (36.4  C) (Oral)   Resp 16   Wt 81.5 kg (179 lb 9.6 oz)   SpO2 98%   BMI 26.79 kg/m     Abnormal labs/tests/findings requiring intervention:---   Pain control: pt had none  Nausea control: pt had none    RECOMMENDATION  Are any infection precautions needed (MRSA, VRE, etc.)? No If yes, what infection? --  ---  Does the patient have mobility issues? independently. If yes, what device does the pt use? ---  ---  Is patient on 72 hour hold or commitment? No If on 72 hour hold, have hold and rights been given to patient? N/A  Are  admitting orders written if after 10 p.m. ?N/A  Tasks needing to be completed:---     Isi Johnston RN   [facility]-- [phone] [facility]

## 2021-04-12 LAB
ALBUMIN SERPL-MCNC: 3.6 G/DL (ref 3.4–5)
ALP SERPL-CCNC: 111 U/L (ref 40–150)
ALT SERPL W P-5'-P-CCNC: 24 U/L (ref 0–70)
AST SERPL W P-5'-P-CCNC: 18 U/L (ref 0–45)
BILIRUB DIRECT SERPL-MCNC: 0.2 MG/DL (ref 0–0.2)
BILIRUB SERPL-MCNC: 0.6 MG/DL (ref 0.2–1.3)
FOLATE SERPL-MCNC: 8.8 NG/ML
INTERPRETATION ECG - MUSE: NORMAL
PROT SERPL-MCNC: 6.8 G/DL (ref 6.8–8.8)
T PALLIDUM AB SER QL: NONREACTIVE
TSH SERPL DL<=0.005 MIU/L-ACNC: 0.95 MU/L (ref 0.4–4)
VIT B12 SERPL-MCNC: 768 PG/ML (ref 193–986)

## 2021-04-12 PROCEDURE — 36415 COLL VENOUS BLD VENIPUNCTURE: CPT | Performed by: PSYCHIATRY & NEUROLOGY

## 2021-04-12 PROCEDURE — 82607 VITAMIN B-12: CPT | Performed by: PSYCHIATRY & NEUROLOGY

## 2021-04-12 PROCEDURE — 250N000013 HC RX MED GY IP 250 OP 250 PS 637: Performed by: PSYCHIATRY & NEUROLOGY

## 2021-04-12 PROCEDURE — 86780 TREPONEMA PALLIDUM: CPT | Performed by: PSYCHIATRY & NEUROLOGY

## 2021-04-12 PROCEDURE — 82746 ASSAY OF FOLIC ACID SERUM: CPT | Performed by: NURSE PRACTITIONER

## 2021-04-12 PROCEDURE — 80076 HEPATIC FUNCTION PANEL: CPT | Performed by: PSYCHIATRY & NEUROLOGY

## 2021-04-12 PROCEDURE — 84443 ASSAY THYROID STIM HORMONE: CPT | Performed by: PSYCHIATRY & NEUROLOGY

## 2021-04-12 PROCEDURE — 250N000013 HC RX MED GY IP 250 OP 250 PS 637: Performed by: EMERGENCY MEDICINE

## 2021-04-12 PROCEDURE — 124N000003 HC R&B MH SENIOR/ADOLESCENT

## 2021-04-12 PROCEDURE — G0177 OPPS/PHP; TRAIN & EDUC SERV: HCPCS

## 2021-04-12 PROCEDURE — 99233 SBSQ HOSP IP/OBS HIGH 50: CPT | Performed by: NURSE PRACTITIONER

## 2021-04-12 PROCEDURE — 82306 VITAMIN D 25 HYDROXY: CPT | Performed by: PSYCHIATRY & NEUROLOGY

## 2021-04-12 PROCEDURE — 250N000013 HC RX MED GY IP 250 OP 250 PS 637: Performed by: NURSE PRACTITIONER

## 2021-04-12 RX ORDER — OLANZAPINE 10 MG/2ML
10 INJECTION, POWDER, FOR SOLUTION INTRAMUSCULAR
Status: DISCONTINUED | OUTPATIENT
Start: 2021-04-12 | End: 2021-04-19 | Stop reason: HOSPADM

## 2021-04-12 RX ORDER — ACETAMINOPHEN 325 MG/1
650 TABLET ORAL EVERY 4 HOURS PRN
Status: DISCONTINUED | OUTPATIENT
Start: 2021-04-12 | End: 2021-04-19 | Stop reason: HOSPADM

## 2021-04-12 RX ORDER — AMOXICILLIN 250 MG
2 CAPSULE ORAL
Status: DISCONTINUED | OUTPATIENT
Start: 2021-04-12 | End: 2021-04-19 | Stop reason: HOSPADM

## 2021-04-12 RX ORDER — GABAPENTIN 100 MG/1
100-300 CAPSULE ORAL 3 TIMES DAILY PRN
Status: DISCONTINUED | OUTPATIENT
Start: 2021-04-12 | End: 2021-04-19 | Stop reason: HOSPADM

## 2021-04-12 RX ORDER — OLANZAPINE 5 MG/1
5-10 TABLET ORAL
Status: DISCONTINUED | OUTPATIENT
Start: 2021-04-12 | End: 2021-04-19 | Stop reason: HOSPADM

## 2021-04-12 RX ADMIN — HALOPERIDOL 5 MG: 5 TABLET ORAL at 20:35

## 2021-04-12 RX ADMIN — ACETAMINOPHEN 650 MG: 325 TABLET, FILM COATED ORAL at 20:35

## 2021-04-12 RX ADMIN — OMEPRAZOLE 20 MG: 20 CAPSULE, DELAYED RELEASE ORAL at 07:56

## 2021-04-12 RX ADMIN — ATORVASTATIN CALCIUM 40 MG: 40 TABLET, FILM COATED ORAL at 07:56

## 2021-04-12 RX ADMIN — HALOPERIDOL 5 MG: 5 TABLET ORAL at 07:56

## 2021-04-12 RX ADMIN — ARIPIPRAZOLE 20 MG: 20 TABLET ORAL at 07:56

## 2021-04-12 RX ADMIN — CLOPIDOGREL BISULFATE 75 MG: 75 TABLET, FILM COATED ORAL at 07:56

## 2021-04-12 RX ADMIN — ASPIRIN 81 MG: 81 TABLET, COATED ORAL at 07:56

## 2021-04-12 RX ADMIN — DIPHENHYDRAMINE HYDROCHLORIDE 50 MG: 25 CAPSULE ORAL at 20:35

## 2021-04-12 RX ADMIN — GABAPENTIN 300 MG: 100 CAPSULE ORAL at 20:35

## 2021-04-12 ASSESSMENT — ACTIVITIES OF DAILY LIVING (ADL)
ORAL_HYGIENE: INDEPENDENT
DRESS: INDEPENDENT
LAUNDRY: WITH SUPERVISION
HYGIENE/GROOMING: INDEPENDENT
HYGIENE/GROOMING: INDEPENDENT
ORAL_HYGIENE: INDEPENDENT
DRESS: INDEPENDENT
LAUNDRY: WITH SUPERVISION

## 2021-04-12 NOTE — PROGRESS NOTES
04/12/21 1700   General Information   Date Initially Attended OT 04/12/21   Clinical Impression   Affect Flat   Orientation Oriented to person, place and time   Appearance and ADLs General cleanliness observed in most areas   Attention to Internal Stimuli No observed signs   Interaction Skills Interacts appropriately with staff   Ability to Communicate Needs Does so with prompts   Verbal Content Clear;Appropriate to topic   Ability to Maintain Boundaries Maintains appropriate physical boundaries;Maintains appropriate verbal boundaries   Participation Independently participates   Concentration Concentrates 30+ minutes   Ability to Concentrate With structure;Needs further assessment   Follows and Comprehends Directions Independently follows 2 step verbal directions;Needs further assessment   Memory Needs further assessment;Delayed and immediate recall intact   Organization Independently organizes medium tasks;Needs further assessment   Decision Making Independent   Planning and Problem Solving Occasionally needs assist/feedback;Independently plans ahead   Ability to Apply and Learn Concepts Applies within group structure   Frustrations / Stress Tolerance Independently identifies sources of frustration/stress   Level of Insight Insightful into needs, issues, goals   Self Esteem Other (see comments)  (Did not answer q about personal strengths)   Social Supports Identifies utilizing supports   General Observation/Plan   General Observations/Plan See Comments   Attended 2 of 2 OT groups.  Participated for 55 minutes in a goal oriented task group with 9 patients.  He learned an activity on the iPad requiring complex problem-solving using visual spatial concepts.  With time he found solutions, appeared patient in working on this task and was pleasant on approach.  He participated in the second group for 50 minutes with 8 patients on the topic of future perspective thinking and positive self identifiers.  Answers were in  "context.  Affect appeared flat, though he appeared attentive and offering answers without delays.  He stated reason for admission as \"hearing voices \".  He chose the OT goal focus to improve problem solving and listen more to other people.  He appeared willing to be involved and participate and attentive. PLAN: Provide support, encourage attendance, Provide opportunity for active involvement with utilizing coping strategies, expanding options as needed and redirect if hallucinations interfere. Provide opportunity for more creative, complex task work to offer success orientation, promote positive self esteem, increased attention and concentration.  "

## 2021-04-12 NOTE — PLAN OF CARE
Pt is calm, pleasant and cooperative. Blunted affect. Mostly withdrawn but did attend OT group. Reports AH this morning before breakfast which he describes as voices that are condescending in nature. Denies command hallucinations. Denies hearing ringing in his ears. Denies SI. Med compliant. Reports sleeping well last night which is a vast improvement for pt. Pt continues to eat and drink well.

## 2021-04-12 NOTE — PLAN OF CARE
Problem: General Plan of Care (Inpatient Behavioral)  Goal: Team Discussion  Description: Team Plan:  Outcome: No Change  Note: BEHAVIORAL TEAM DISCUSSION    Participants: Donaldo ORNELAS DNP, Eliana Emery CTC, Faby Arroyo RN, Karen Sharp OT  Progress: New admit  Anticipated length of stay: TBD  Continued Stay Criteria/Rationale: Psychotic symptoms  Medical/Physical: History of CVA  Precautions:   Behavioral Orders   Procedures     Code 1 - Restrict to Unit     Fall precautions     Routine Programming     As clinically indicated     Status 15     Every 15 minutes.     Plan: Psychiatric Assessment. Medication Management. Therapeutic Mileu. Individual and group support.   Rationale for change in precautions or plan: Initial plan

## 2021-04-12 NOTE — PROGRESS NOTES
"[facility] [place]   Psychiatric Progress Note        Interim History:   From H&P: The patient has a complex psychiatric history of paranoid psychosis, schizophrenia, history of stroke in 2012.  He came to the emergency room because he has been having increasing auditory hallucinations.  He stopped taking his medications 3 months ago and restarted them, but the voices have been increasing.  He is not able to sleep.  He made an appointment with his psychiatrist Tuesday and was restarted on his medications and Abilify injection was added, but he has not yet received it.  Because he is not able to sleep and he was pacing, he came to the emergency room.  He experiences following symptoms:  He has auditory hallucinations.  These are accusing him of past deeds, very disruptive.    Team meeting report: The patient's care was discussed with the treatment team during the daily team meeting and/or staff's chart notes were reviewed.  Staff report patient has been calm, pleasant, cooperative. Patient is attending groups and participating appropriately. Patient is eating well and taking medications as prescribed. Slept all night.     Met with patient.  He is very pleasant.  \"I did not expect to be seen today and I'm not prepared\".  Reports the reason for admission is having auditory hallucinations.  He is starting college.  Believes that his roommate's girlfriend committed suicide because the patient was masturbating in his bed.  He has had the auditory hallucinations on and off most of his life.  When he is taking his medications, auditory hallucinations go away completely.  He stopped taking his medications 3 months ago because he wanted to communicate telepathically with others.  Today, the patient feels tired.  He appears anxious but denied it.  Does not know if he is depressed.  Continues to have auditory hallucinations derogatory in nature.  Feels slight improvement since admission.            "  Medications:       ARIPiprazole  20 mg Oral Daily     aspirin  81 mg Oral Daily     atorvastatin  40 mg Oral Daily     clopidogrel  75 mg Oral or NG Tube Daily     diphenhydrAMINE  50 mg Oral At Bedtime     haloperidol  5 mg Oral BID     omeprazole  20 mg Oral QAM AC          Allergies:   No Known Allergies       Labs:     Recent Results (from the past 672 hour(s))   Drug abuse screen 6 urine (chem dep)    Collection Time: 04/11/21 12:37 AM   Result Value Ref Range    Amphetamine Qual Urine Negative NEG^Negative    Barbiturates Qual Urine Negative NEG^Negative    Benzodiazepine Qual Urine Negative NEG^Negative    Cannabinoids Qual Urine Negative NEG^Negative    Cocaine Qual Urine Negative NEG^Negative    Ethanol Qual Urine Negative NEG^Negative    Opiates Qualitative Urine Negative NEG^Negative   Asymptomatic Influenza A/B & SARS-CoV2 (COVID-19) Virus PCR Multiplex    Collection Time: 04/11/21  1:50 AM    Specimen: Nasopharyngeal   Result Value Ref Range    Flu A/B & SARS-COV-2 PCR Source Nasopharyngeal     SARS-CoV-2 PCR Result NEGATIVE     Influenza A PCR Negative NEG^Negative    Influenza B PCR Negative NEG^Negative    Respiratory Syncytial Virus PCR (Note)     Flu A/B & SARS-CoV-2 PCR Comment (Note)    CBC with platelets differential    Collection Time: 04/11/21  2:09 AM   Result Value Ref Range    WBC 6.7 4.0 - 11.0 10e9/L    RBC Count 5.40 4.4 - 5.9 10e12/L    Hemoglobin 14.8 13.3 - 17.7 g/dL    Hematocrit 46.1 40.0 - 53.0 %    MCV 85 78 - 100 fl    MCH 27.4 26.5 - 33.0 pg    MCHC 32.1 31.5 - 36.5 g/dL    RDW 14.0 10.0 - 15.0 %    Platelet Count 189 150 - 450 10e9/L    Diff Method Automated Method     % Neutrophils 61.3 %    % Lymphocytes 23.3 %    % Monocytes 9.1 %    % Eosinophils 4.9 %    % Basophils 1.0 %    % Immature Granulocytes 0.4 %    Nucleated RBCs 0 0 /100    Absolute Neutrophil 4.1 1.6 - 8.3 10e9/L    Absolute Lymphocytes 1.6 0.8 - 5.3 10e9/L    Absolute Monocytes 0.6 0.0 - 1.3 10e9/L    Absolute  Eosinophils 0.3 0.0 - 0.7 10e9/L    Absolute Basophils 0.1 0.0 - 0.2 10e9/L    Abs Immature Granulocytes 0.0 0 - 0.4 10e9/L    Absolute Nucleated RBC 0.0    Basic metabolic panel    Collection Time: 04/11/21  2:09 AM   Result Value Ref Range    Sodium 140 133 - 144 mmol/L    Potassium 4.4 3.4 - 5.3 mmol/L    Chloride 109 94 - 109 mmol/L    Carbon Dioxide 24 20 - 32 mmol/L    Anion Gap 7 3 - 14 mmol/L    Glucose 100 (H) 70 - 99 mg/dL    Urea Nitrogen 22 7 - 30 mg/dL    Creatinine 0.95 0.66 - 1.25 mg/dL    GFR Estimate 83 >60 mL/min/[1.73_m2]    GFR Estimate If Black >90 >60 mL/min/[1.73_m2]    Calcium 8.6 8.5 - 10.1 mg/dL   EKG 12-lead, complete    Collection Time: 04/11/21  9:14 PM   Result Value Ref Range    Interpretation ECG Click View Image link to view waveform and result    TSH with free T4 reflex and/or T3 as indicated    Collection Time: 04/12/21  7:11 AM   Result Value Ref Range    TSH 0.95 0.40 - 4.00 mU/L   Vitamin B12    Collection Time: 04/12/21  7:11 AM   Result Value Ref Range    Vitamin B12 768 193 - 986 pg/mL   Folate    Collection Time: 04/12/21  7:11 AM   Result Value Ref Range    Folate 8.8 >5.4 ng/mL   Hepatic panel    Collection Time: 04/12/21  7:11 AM   Result Value Ref Range    Bilirubin Direct 0.2 0.0 - 0.2 mg/dL    Bilirubin Total 0.6 0.2 - 1.3 mg/dL    Albumin 3.6 3.4 - 5.0 g/dL    Protein Total 6.8 6.8 - 8.8 g/dL    Alkaline Phosphatase 111 40 - 150 U/L    ALT 24 0 - 70 U/L    AST 18 0 - 45 U/L   Treponema Abs w Reflex to RPR and Titer    Collection Time: 04/12/21  7:11 AM   Result Value Ref Range    Treponema Antibodies Nonreactive NR^Nonreactive            Psychiatric Examination:   Temp: (P) 97.5  F (36.4  C) Temp src: (P) Temporal BP: (P) 125/83 Pulse: (P) 59   Resp: (P) 16 SpO2: (P) 96 % O2 Device: (P) None (Room air)    Weight is 174 lbs 3.2 oz  Body mass index is 25 kg/m .    Appearance: adequately groomed, awake, cooperative, mild distress and normal weight  Attitude:   cooperative  Eye Contact:  good  Mood:  anxious  Affect:  mood congruent  Speech:  dysarthria and pressured speech  Psychomotor Behavior:  no evidence of tardive dyskinesia, dystonia, or tics  Throught Process:  linear, disorganized, illogical and tangental  Associations:  no loose associations  Thought Content:  no evidence of suicidal ideation or homicidal ideation and reports AH, derogatory in nature.   Insight:  limited  Judgement:  limited  Oriented to:  time, person, and place  Attention Span and Concentration:  limited  Recent and Remote Memory:  fair         Precautions:     Behavioral Orders   Procedures     Code 1 - Restrict to Unit     Fall precautions     Routine Programming     As clinically indicated     Status 15     Every 15 minutes.          DIagnoses:   1.  Paranoid schizophrenia, recurrent, severe, with psychosis  2.  History of stroke in 2012  3.  Additional medical problems include: GERD, dysphagia, hyperlipidemia         Plan:   --Abilify 20 mg every morning  --Haldol 5 mg twice a day  --Benadryl 50 mg at bedtime  --Additional as needed medications will include gabapentin, Zyprexa, and trazodone    --Blood work was reviewed  --Internal medicine follow-up for medical problems     Disposition Plan   Reason for ongoing admission: is unable to care for self due to psychosis  Disposition: Home  Estimated length of stay: 7 to 10 days  Legal Status: Voluntary  Discharge will be granted once symptoms improved.    Donaldo ORNELAS CNP  Date: 04/12/21  Time: 3:33 PM       This note was created with the help of Dragon dictation system. All grammatical/typing errors or context distortion are unintentional and inherent to software.    More than 30 minutes were spent for assessment, documentation, and coordination of care.

## 2021-04-12 NOTE — PLAN OF CARE
Work Completed: The patient's care was discussed with the treatment team and chart notes were reviewed. Completed team note. Writer attempted to meet patient to complete personal plan of care, but patient was sleeping each time.    Discharge plan or goal: Home                Barriers to discharge: Stabilization of psychotic symptoms

## 2021-04-12 NOTE — PLAN OF CARE
Work Completed: The patient's care was discussed with the treatment team and chart notes were reviewed. Team note completed.    Discharge plan or goal: Home                Barriers to discharge: Stabilization of psychotic symptoms

## 2021-04-12 NOTE — PLAN OF CARE
Pt calm, pleasant and cooperative. Affect blunted but brightens on approach. Still reports hearing voices but thinks they may have improved slightly. Denies ringing in his ears. Denies SI. C/o slight headache but declines need for Tylenol or other interventions initially. Encouraged pt to let staff know if he would like something later. Reports he did not attend group this evening d/t headache and opted to rest instead. Eating well. Pt did request Tylenol with HS meds for headache and gabapentin 300 mg for anxiety/sleep.

## 2021-04-12 NOTE — PLAN OF CARE
Pt presents with blunted, flat affect and calm mood. Denies SI/SIB and hallucinations at time of check in. Rates depression 2/10 and denies anxiety. Pt was pleasant with writer. He remained largely isolated to his room this shift and did not attend any groups. Appetite and fluid intake adequate. DANNIE signed for his sister and she was provided with an update. VSS. Denies pain. Medication complaint. No other concerns or complaints noted.

## 2021-04-13 LAB — DEPRECATED CALCIDIOL+CALCIFEROL SERPL-MC: 38 UG/L (ref 20–75)

## 2021-04-13 PROCEDURE — G0177 OPPS/PHP; TRAIN & EDUC SERV: HCPCS

## 2021-04-13 PROCEDURE — 250N000013 HC RX MED GY IP 250 OP 250 PS 637: Performed by: NURSE PRACTITIONER

## 2021-04-13 PROCEDURE — 250N000013 HC RX MED GY IP 250 OP 250 PS 637: Performed by: EMERGENCY MEDICINE

## 2021-04-13 PROCEDURE — 99233 SBSQ HOSP IP/OBS HIGH 50: CPT | Performed by: NURSE PRACTITIONER

## 2021-04-13 PROCEDURE — 124N000003 HC R&B MH SENIOR/ADOLESCENT

## 2021-04-13 PROCEDURE — 250N000013 HC RX MED GY IP 250 OP 250 PS 637: Performed by: PSYCHIATRY & NEUROLOGY

## 2021-04-13 RX ORDER — HALOPERIDOL 5 MG/1
10 TABLET ORAL AT BEDTIME
Status: DISCONTINUED | OUTPATIENT
Start: 2021-04-13 | End: 2021-04-15

## 2021-04-13 RX ORDER — ARIPIPRAZOLE 5 MG/1
10 TABLET ORAL AT BEDTIME
Status: DISCONTINUED | OUTPATIENT
Start: 2021-04-14 | End: 2021-04-15

## 2021-04-13 RX ADMIN — ATORVASTATIN CALCIUM 40 MG: 40 TABLET, FILM COATED ORAL at 08:03

## 2021-04-13 RX ADMIN — ASPIRIN 81 MG: 81 TABLET, COATED ORAL at 08:03

## 2021-04-13 RX ADMIN — CLOPIDOGREL BISULFATE 75 MG: 75 TABLET, FILM COATED ORAL at 08:03

## 2021-04-13 RX ADMIN — HALOPERIDOL 5 MG: 5 TABLET ORAL at 08:03

## 2021-04-13 RX ADMIN — HALOPERIDOL 10 MG: 5 TABLET ORAL at 20:11

## 2021-04-13 RX ADMIN — DIPHENHYDRAMINE HYDROCHLORIDE 50 MG: 25 CAPSULE ORAL at 20:11

## 2021-04-13 RX ADMIN — OMEPRAZOLE 20 MG: 20 CAPSULE, DELAYED RELEASE ORAL at 06:37

## 2021-04-13 RX ADMIN — ARIPIPRAZOLE 20 MG: 20 TABLET ORAL at 08:03

## 2021-04-13 ASSESSMENT — ACTIVITIES OF DAILY LIVING (ADL)
ORAL_HYGIENE: INDEPENDENT
HYGIENE/GROOMING: INDEPENDENT
DRESS: INDEPENDENT
LAUNDRY: WITH SUPERVISION

## 2021-04-13 ASSESSMENT — MIFFLIN-ST. JEOR: SCORE: 1579.59

## 2021-04-13 NOTE — PLAN OF CARE
Pt calm, pleasant and cooperative. Attending most groups. Affect blunted but brightens on approach. Denies auditory hallucinations this morning. Denies depression/anxiety/SI. Denies pain. Reports sleeping well. Appetite good. Pt showered and shaved this afternoon.

## 2021-04-13 NOTE — PROGRESS NOTES
"[facility], [place]   Psychiatric Progress Note        Interim History:   From H&P: The patient has a complex psychiatric history of paranoid psychosis, schizophrenia, history of stroke in 2012.  He came to the emergency room because he has been having increasing auditory hallucinations.  He stopped taking his medications 3 months ago and restarted them, but the voices have been increasing.  He is not able to sleep.  He made an appointment with his psychiatrist Tuesday and was restarted on his medications and Abilify injection was added, but he has not yet received it.  Because he is not able to sleep and he was pacing, he came to the emergency room.  He experiences following symptoms:  He has auditory hallucinations.  These are accusing him of past deeds, very disruptive.    Team meeting report: The patient's care was discussed with the treatment team during the daily team meeting and/or staff's chart notes were reviewed.  Staff report patient has been calm, pleasant, cooperative. Patient is attending groups and participating appropriately. Patient is eating well and taking medications as prescribed.  Reports some slight improvement in auditory hallucinations.  Affect is flat, brightens on approach.  Denies ringing in the ears.  Complain of headache which prevented him from going to groups in the evening.  Took as needed gabapentin for sleep and pain.  Slept all night.     Met with patient.  He is very pleasant and cooperative.  He is smiling.  He is feeling better.  Major concern is feeling tired during the day.  He is sleeping much better now, before admission he was not.  He still feels tired.  Reports \"he will auditory hallucinations\".  Discussed medication changes.  Patient would like to try changing her medications at bedtime and see if energy will improve.         Medications:       ARIPiprazole  20 mg Oral Daily     aspirin  81 mg Oral Daily     atorvastatin  40 mg Oral Daily "     clopidogrel  75 mg Oral or NG Tube Daily     diphenhydrAMINE  50 mg Oral At Bedtime     haloperidol  5 mg Oral BID     omeprazole  20 mg Oral QAM AC          Allergies:   No Known Allergies       Labs:     Recent Results (from the past 672 hour(s))   Drug abuse screen 6 urine (chem dep)    Collection Time: 04/11/21 12:37 AM   Result Value Ref Range    Amphetamine Qual Urine Negative NEG^Negative    Barbiturates Qual Urine Negative NEG^Negative    Benzodiazepine Qual Urine Negative NEG^Negative    Cannabinoids Qual Urine Negative NEG^Negative    Cocaine Qual Urine Negative NEG^Negative    Ethanol Qual Urine Negative NEG^Negative    Opiates Qualitative Urine Negative NEG^Negative   Asymptomatic Influenza A/B & SARS-CoV2 (COVID-19) Virus PCR Multiplex    Collection Time: 04/11/21  1:50 AM    Specimen: Nasopharyngeal   Result Value Ref Range    Flu A/B & SARS-COV-2 PCR Source Nasopharyngeal     SARS-CoV-2 PCR Result NEGATIVE     Influenza A PCR Negative NEG^Negative    Influenza B PCR Negative NEG^Negative    Respiratory Syncytial Virus PCR (Note)     Flu A/B & SARS-CoV-2 PCR Comment (Note)    CBC with platelets differential    Collection Time: 04/11/21  2:09 AM   Result Value Ref Range    WBC 6.7 4.0 - 11.0 10e9/L    RBC Count 5.40 4.4 - 5.9 10e12/L    Hemoglobin 14.8 13.3 - 17.7 g/dL    Hematocrit 46.1 40.0 - 53.0 %    MCV 85 78 - 100 fl    MCH 27.4 26.5 - 33.0 pg    MCHC 32.1 31.5 - 36.5 g/dL    RDW 14.0 10.0 - 15.0 %    Platelet Count 189 150 - 450 10e9/L    Diff Method Automated Method     % Neutrophils 61.3 %    % Lymphocytes 23.3 %    % Monocytes 9.1 %    % Eosinophils 4.9 %    % Basophils 1.0 %    % Immature Granulocytes 0.4 %    Nucleated RBCs 0 0 /100    Absolute Neutrophil 4.1 1.6 - 8.3 10e9/L    Absolute Lymphocytes 1.6 0.8 - 5.3 10e9/L    Absolute Monocytes 0.6 0.0 - 1.3 10e9/L    Absolute Eosinophils 0.3 0.0 - 0.7 10e9/L    Absolute Basophils 0.1 0.0 - 0.2 10e9/L    Abs Immature Granulocytes 0.0 0 -  0.4 10e9/L    Absolute Nucleated RBC 0.0    Basic metabolic panel    Collection Time: 04/11/21  2:09 AM   Result Value Ref Range    Sodium 140 133 - 144 mmol/L    Potassium 4.4 3.4 - 5.3 mmol/L    Chloride 109 94 - 109 mmol/L    Carbon Dioxide 24 20 - 32 mmol/L    Anion Gap 7 3 - 14 mmol/L    Glucose 100 (H) 70 - 99 mg/dL    Urea Nitrogen 22 7 - 30 mg/dL    Creatinine 0.95 0.66 - 1.25 mg/dL    GFR Estimate 83 >60 mL/min/[1.73_m2]    GFR Estimate If Black >90 >60 mL/min/[1.73_m2]    Calcium 8.6 8.5 - 10.1 mg/dL   EKG 12-lead, complete    Collection Time: 04/11/21  9:14 PM   Result Value Ref Range    Interpretation ECG Click View Image link to view waveform and result    TSH with free T4 reflex and/or T3 as indicated    Collection Time: 04/12/21  7:11 AM   Result Value Ref Range    TSH 0.95 0.40 - 4.00 mU/L   Vitamin B12    Collection Time: 04/12/21  7:11 AM   Result Value Ref Range    Vitamin B12 768 193 - 986 pg/mL   Folate    Collection Time: 04/12/21  7:11 AM   Result Value Ref Range    Folate 8.8 >5.4 ng/mL   Hepatic panel    Collection Time: 04/12/21  7:11 AM   Result Value Ref Range    Bilirubin Direct 0.2 0.0 - 0.2 mg/dL    Bilirubin Total 0.6 0.2 - 1.3 mg/dL    Albumin 3.6 3.4 - 5.0 g/dL    Protein Total 6.8 6.8 - 8.8 g/dL    Alkaline Phosphatase 111 40 - 150 U/L    ALT 24 0 - 70 U/L    AST 18 0 - 45 U/L   Treponema Abs w Reflex to RPR and Titer    Collection Time: 04/12/21  7:11 AM   Result Value Ref Range    Treponema Antibodies Nonreactive NR^Nonreactive            Psychiatric Examination:   Temp: 97.3  F (36.3  C) Temp src: Temporal BP: 117/78 Pulse: 62   Resp: 16 SpO2: 96 % O2 Device: None (Room air)    Weight is 174 lbs 3.2 oz  Body mass index is 25 kg/m .    Appearance: adequately groomed, awake, cooperative, mild distress and normal weight  Attitude:  cooperative  Eye Contact:  good  Mood:  anxious  Affect:  mood congruent  Speech:  dysarthria and pressured speech  Psychomotor Behavior:  no evidence  of tardive dyskinesia, dystonia, or tics  Throught Process:  linear, disorganized, illogical and tangental  Associations:  no loose associations  Thought Content:  no evidence of suicidal ideation or homicidal ideation and reports AH, derogatory in nature.   Insight:  limited  Judgement:  limited  Oriented to:  time, person, and place  Attention Span and Concentration:  limited  Recent and Remote Memory:  fair         Precautions:     Behavioral Orders   Procedures     Code 1 - Restrict to Unit     Fall precautions     Routine Programming     As clinically indicated     Status 15     Every 15 minutes.          DIagnoses:   1.  Paranoid schizophrenia, recurrent, severe, with psychosis  2.  History of stroke in 2012  3.  Additional medical problems include: GERD, dysphagia, hyperlipidemia         Plan:   --Change Abilify 10 mg at hs  --Change Haldol to 10 mg, qhs  --Benadryl 50 mg at bedtime  --Additional as needed medications will include gabapentin, Zyprexa, and trazodone    --Blood work was reviewed  --Internal medicine follow-up for medical problems     Disposition Plan   Reason for ongoing admission: is unable to care for self due to psychosis  Disposition: Home  Estimated length of stay: 7 to 10 days  Legal Status: Voluntary  Discharge will be granted once symptoms improved.    Donaldo ORNELAS CNP  Date: 04/13/21  Time: 2:56 PM        This note was created with the help of Dragon dictation system. All grammatical/typing errors or context distortion are unintentional and inherent to software.    More than 30 minutes were spent for assessment, documentation, and coordination of care.

## 2021-04-13 NOTE — PLAN OF CARE
Problem: OT General Care Plan  Goal: OT Goal 1  Description: Will participate in opportunities to assess and build skills in organizing thoughts and to enhance comfort with others.   Note: Attended 1 of 2 OT groups, being the AM goal oriented task group for 45 minutes with 6 pts. He worked quietly on the IPAD with an activity focused on utilizing familiar step problem solving tasks. Affect appeared serious. He was pleasant on approach.

## 2021-04-13 NOTE — PLAN OF CARE
Work Completed: The patient's care was discussed with the treatment team and chart notes were reviewed. Met patent with provider. Patient reported a decrease in hallucinations. He asked for medication to help with sleep. He indicated he didn't need additional supports at discharge. He is happy with his medication provider. Writer worked on the AVS.    Discharge plan or goal: Home                Barriers to discharge: Stabilization of psychotic symptoms

## 2021-04-14 PROCEDURE — 250N000013 HC RX MED GY IP 250 OP 250 PS 637: Performed by: NURSE PRACTITIONER

## 2021-04-14 PROCEDURE — 99233 SBSQ HOSP IP/OBS HIGH 50: CPT | Performed by: NURSE PRACTITIONER

## 2021-04-14 PROCEDURE — 250N000013 HC RX MED GY IP 250 OP 250 PS 637: Performed by: PSYCHIATRY & NEUROLOGY

## 2021-04-14 PROCEDURE — 124N000003 HC R&B MH SENIOR/ADOLESCENT

## 2021-04-14 PROCEDURE — G0177 OPPS/PHP; TRAIN & EDUC SERV: HCPCS

## 2021-04-14 PROCEDURE — 250N000013 HC RX MED GY IP 250 OP 250 PS 637: Performed by: EMERGENCY MEDICINE

## 2021-04-14 RX ADMIN — GABAPENTIN 300 MG: 100 CAPSULE ORAL at 20:56

## 2021-04-14 RX ADMIN — ARIPIPRAZOLE 10 MG: 5 TABLET ORAL at 20:56

## 2021-04-14 RX ADMIN — OLANZAPINE 10 MG: 5 TABLET, FILM COATED ORAL at 22:21

## 2021-04-14 RX ADMIN — ACETAMINOPHEN 650 MG: 325 TABLET, FILM COATED ORAL at 18:27

## 2021-04-14 RX ADMIN — ASPIRIN 81 MG: 81 TABLET, COATED ORAL at 08:10

## 2021-04-14 RX ADMIN — HALOPERIDOL 10 MG: 5 TABLET ORAL at 20:56

## 2021-04-14 RX ADMIN — DIPHENHYDRAMINE HYDROCHLORIDE 50 MG: 25 CAPSULE ORAL at 20:56

## 2021-04-14 RX ADMIN — OMEPRAZOLE 20 MG: 20 CAPSULE, DELAYED RELEASE ORAL at 06:49

## 2021-04-14 RX ADMIN — ATORVASTATIN CALCIUM 40 MG: 40 TABLET, FILM COATED ORAL at 08:10

## 2021-04-14 RX ADMIN — CLOPIDOGREL BISULFATE 75 MG: 75 TABLET, FILM COATED ORAL at 08:10

## 2021-04-14 ASSESSMENT — ACTIVITIES OF DAILY LIVING (ADL)
DRESS: INDEPENDENT
ORAL_HYGIENE: INDEPENDENT
HYGIENE/GROOMING: INDEPENDENT
LAUNDRY: UNABLE TO COMPLETE

## 2021-04-14 NOTE — PROGRESS NOTES
"[facility], [place]   Psychiatric Progress Note        Interim History:   From H&P: The patient has a complex psychiatric history of paranoid psychosis, schizophrenia, history of stroke in 2012.  He came to the emergency room because he has been having increasing auditory hallucinations.  He stopped taking his medications 3 months ago and restarted them, but the voices have been increasing.  He is not able to sleep.  He made an appointment with his psychiatrist Tuesday and was restarted on his medications and Abilify injection was added, but he has not yet received it.  Because he is not able to sleep and he was pacing, he came to the emergency room.  He experiences following symptoms:  He has auditory hallucinations.  These are accusing him of past deeds, very disruptive.    Team meeting report: The patient's care was discussed with the treatment team during the daily team meeting and/or staff's chart notes were reviewed.  Staff report patient has been calm, pleasant, cooperative. Patient is attending groups and participating appropriately. Patient is eating well and taking medications as prescribed.  Affect is flat, brightens on approach.  Denies ringing in the ears.  Denies depression, anxiety, SI.  Minimal auditory hallucinations.  Slept all night.     Met with patient.  Doing much better.  Anxiety is still a problem, \"I am always anxious\".  Continues to have some auditory hallucinations.  At baseline, he does not have hallucinations at home.  Does not feel suicidal homicidal.  Discussed disposition.  The patient requested to be discharged tomorrow at 11:00 however, later approach this provider stating that he was discharged on Friday instead.         Medications:       ARIPiprazole  10 mg Oral At Bedtime     aspirin  81 mg Oral Daily     atorvastatin  40 mg Oral Daily     clopidogrel  75 mg Oral or NG Tube Daily     diphenhydrAMINE  50 mg Oral At Bedtime     haloperidol  10 mg Oral " At Bedtime     omeprazole  20 mg Oral QAM AC          Allergies:   No Known Allergies       Labs:     Recent Results (from the past 672 hour(s))   Drug abuse screen 6 urine (chem dep)    Collection Time: 04/11/21 12:37 AM   Result Value Ref Range    Amphetamine Qual Urine Negative NEG^Negative    Barbiturates Qual Urine Negative NEG^Negative    Benzodiazepine Qual Urine Negative NEG^Negative    Cannabinoids Qual Urine Negative NEG^Negative    Cocaine Qual Urine Negative NEG^Negative    Ethanol Qual Urine Negative NEG^Negative    Opiates Qualitative Urine Negative NEG^Negative   Asymptomatic Influenza A/B & SARS-CoV2 (COVID-19) Virus PCR Multiplex    Collection Time: 04/11/21  1:50 AM    Specimen: Nasopharyngeal   Result Value Ref Range    Flu A/B & SARS-COV-2 PCR Source Nasopharyngeal     SARS-CoV-2 PCR Result NEGATIVE     Influenza A PCR Negative NEG^Negative    Influenza B PCR Negative NEG^Negative    Respiratory Syncytial Virus PCR (Note)     Flu A/B & SARS-CoV-2 PCR Comment (Note)    CBC with platelets differential    Collection Time: 04/11/21  2:09 AM   Result Value Ref Range    WBC 6.7 4.0 - 11.0 10e9/L    RBC Count 5.40 4.4 - 5.9 10e12/L    Hemoglobin 14.8 13.3 - 17.7 g/dL    Hematocrit 46.1 40.0 - 53.0 %    MCV 85 78 - 100 fl    MCH 27.4 26.5 - 33.0 pg    MCHC 32.1 31.5 - 36.5 g/dL    RDW 14.0 10.0 - 15.0 %    Platelet Count 189 150 - 450 10e9/L    Diff Method Automated Method     % Neutrophils 61.3 %    % Lymphocytes 23.3 %    % Monocytes 9.1 %    % Eosinophils 4.9 %    % Basophils 1.0 %    % Immature Granulocytes 0.4 %    Nucleated RBCs 0 0 /100    Absolute Neutrophil 4.1 1.6 - 8.3 10e9/L    Absolute Lymphocytes 1.6 0.8 - 5.3 10e9/L    Absolute Monocytes 0.6 0.0 - 1.3 10e9/L    Absolute Eosinophils 0.3 0.0 - 0.7 10e9/L    Absolute Basophils 0.1 0.0 - 0.2 10e9/L    Abs Immature Granulocytes 0.0 0 - 0.4 10e9/L    Absolute Nucleated RBC 0.0    Basic metabolic panel    Collection Time: 04/11/21  2:09 AM    Result Value Ref Range    Sodium 140 133 - 144 mmol/L    Potassium 4.4 3.4 - 5.3 mmol/L    Chloride 109 94 - 109 mmol/L    Carbon Dioxide 24 20 - 32 mmol/L    Anion Gap 7 3 - 14 mmol/L    Glucose 100 (H) 70 - 99 mg/dL    Urea Nitrogen 22 7 - 30 mg/dL    Creatinine 0.95 0.66 - 1.25 mg/dL    GFR Estimate 83 >60 mL/min/[1.73_m2]    GFR Estimate If Black >90 >60 mL/min/[1.73_m2]    Calcium 8.6 8.5 - 10.1 mg/dL   EKG 12-lead, complete    Collection Time: 04/11/21  9:14 PM   Result Value Ref Range    Interpretation ECG Click View Image link to view waveform and result    TSH with free T4 reflex and/or T3 as indicated    Collection Time: 04/12/21  7:11 AM   Result Value Ref Range    TSH 0.95 0.40 - 4.00 mU/L   Vitamin D    Collection Time: 04/12/21  7:11 AM   Result Value Ref Range    Vitamin D Deficiency screening 38 20 - 75 ug/L   Vitamin B12    Collection Time: 04/12/21  7:11 AM   Result Value Ref Range    Vitamin B12 768 193 - 986 pg/mL   Folate    Collection Time: 04/12/21  7:11 AM   Result Value Ref Range    Folate 8.8 >5.4 ng/mL   Hepatic panel    Collection Time: 04/12/21  7:11 AM   Result Value Ref Range    Bilirubin Direct 0.2 0.0 - 0.2 mg/dL    Bilirubin Total 0.6 0.2 - 1.3 mg/dL    Albumin 3.6 3.4 - 5.0 g/dL    Protein Total 6.8 6.8 - 8.8 g/dL    Alkaline Phosphatase 111 40 - 150 U/L    ALT 24 0 - 70 U/L    AST 18 0 - 45 U/L   Treponema Abs w Reflex to RPR and Titer    Collection Time: 04/12/21  7:11 AM   Result Value Ref Range    Treponema Antibodies Nonreactive NR^Nonreactive            Psychiatric Examination:   Temp: 97  F (36.1  C) Temp src: Temporal BP: 103/74 Pulse: 81   Resp: 16 SpO2: 96 % O2 Device: None (Room air)    Weight is 174 lbs 14.4 oz  Body mass index is 25.1 kg/m .    Appearance: adequately groomed, awake, cooperative, mild distress and normal weight  Attitude:  cooperative  Eye Contact:  good  Mood:  anxious  Affect:  mood congruent  Speech:  dysarthria and pressured speech  Psychomotor  Behavior:  no evidence of tardive dyskinesia, dystonia, or tics  Throught Process:  linear, disorganized, illogical and tangental  Associations:  no loose associations  Thought Content:  no evidence of suicidal ideation or homicidal ideation and reports AH, derogatory in nature.   Insight:  limited  Judgement:  limited  Oriented to:  time, person, and place  Attention Span and Concentration:  limited  Recent and Remote Memory:  fair         Precautions:     Behavioral Orders   Procedures     Code 1 - Restrict to Unit     Fall precautions     Routine Programming     As clinically indicated     Status 15     Every 15 minutes.          DIagnoses:   1.  Paranoid schizophrenia, recurrent, severe, with psychosis  2.  History of stroke in 2012  3.  Additional medical problems include: GERD, dysphagia, hyperlipidemia         Plan:   --Change Abilify 10 mg at hs  --Change Haldol to 10 mg, qhs  --Benadryl 50 mg at bedtime  --Additional as needed medications will include gabapentin, Zyprexa, and trazodone    --Blood work was reviewed  --Internal medicine follow-up for medical problems     Disposition Plan   Reason for ongoing admission: is unable to care for self due to psychosis  Disposition: Home  Estimated length of stay: 7 to 10 days  Legal Status: Voluntary  Discharge will be granted once symptoms improved.    Donaldo ORNELAS CNP  Date: 04/14/21  Time: 1:23 PM    This note was created with the help of Dragon dictation system. All grammatical/typing errors or context distortion are unintentional and inherent to software.    More than 30 minutes were spent for assessment, documentation, and coordination of care.

## 2021-04-14 NOTE — PLAN OF CARE
Problem: OT General Care Plan  Goal: OT Goal 1  Description: Will participate in opportunities to assess and build skills in organizing thoughts and to enhance comfort with others.   Note: Attended 2 of 2 OT groups for total of 105 minutes with 7 patients.  He worked on assorted activities on the iPad requiring using problem solving skills.  He was successful in exploring ideas independently, and in finding solutions.  He was pleasant on approach and offered more elaboration on comments than in past groups.  He participated in choosing an affirmation he wanted to focus on for the day as well as planned some coping strategies from the list provided that he would practice today, then make a decision about whether it was helpful or not.  He appeared attentive and more involved.

## 2021-04-14 NOTE — PLAN OF CARE
"  Pt visible in milieu for dinner. Eye contact appropriate. Affect blunted. Pleasant, cooperative, calm. Withdrawn and isolative to room at other times. Reports sleep was \"better last night than in a while.\" Denies AH this afternoon/evening. Denies depressive symptoms, anxiety, or thoughts of self-harm. Med-compliant. Continue with current treatment plan and recommendations. Continue to monitor and reassess symptoms. Monitor response to medications. Monitor progress towards treatment goals. Encourage groups and participation.  "

## 2021-04-14 NOTE — PLAN OF CARE
"Pt verbalized his \"goal\" is to discharge on Friday. Pt reports \"fine\" sleep last night and his mood is \"improving\". Pt denies auditory hallucinations. Pt shaved this shift.   "

## 2021-04-15 PROCEDURE — 250N000013 HC RX MED GY IP 250 OP 250 PS 637: Performed by: PSYCHIATRY & NEUROLOGY

## 2021-04-15 PROCEDURE — 99233 SBSQ HOSP IP/OBS HIGH 50: CPT | Performed by: NURSE PRACTITIONER

## 2021-04-15 PROCEDURE — 250N000013 HC RX MED GY IP 250 OP 250 PS 637: Performed by: EMERGENCY MEDICINE

## 2021-04-15 PROCEDURE — 250N000013 HC RX MED GY IP 250 OP 250 PS 637: Performed by: NURSE PRACTITIONER

## 2021-04-15 PROCEDURE — 124N000003 HC R&B MH SENIOR/ADOLESCENT

## 2021-04-15 RX ORDER — OLANZAPINE 10 MG/1
10 TABLET ORAL AT BEDTIME
Status: DISCONTINUED | OUTPATIENT
Start: 2021-04-15 | End: 2021-04-19 | Stop reason: HOSPADM

## 2021-04-15 RX ORDER — OLANZAPINE 5 MG/1
5 TABLET ORAL EVERY MORNING
Status: DISCONTINUED | OUTPATIENT
Start: 2021-04-15 | End: 2021-04-19 | Stop reason: HOSPADM

## 2021-04-15 RX ORDER — ARIPIPRAZOLE 20 MG/1
20 TABLET ORAL EVERY MORNING
Status: DISCONTINUED | OUTPATIENT
Start: 2021-04-15 | End: 2021-04-19 | Stop reason: HOSPADM

## 2021-04-15 RX ADMIN — OLANZAPINE 10 MG: 10 TABLET, FILM COATED ORAL at 20:11

## 2021-04-15 RX ADMIN — OMEPRAZOLE 20 MG: 20 CAPSULE, DELAYED RELEASE ORAL at 06:33

## 2021-04-15 RX ADMIN — CLOPIDOGREL BISULFATE 75 MG: 75 TABLET, FILM COATED ORAL at 08:11

## 2021-04-15 RX ADMIN — ARIPIPRAZOLE 20 MG: 20 TABLET ORAL at 10:02

## 2021-04-15 RX ADMIN — DIPHENHYDRAMINE HYDROCHLORIDE 50 MG: 25 CAPSULE ORAL at 20:11

## 2021-04-15 RX ADMIN — OLANZAPINE 5 MG: 5 TABLET, FILM COATED ORAL at 10:02

## 2021-04-15 RX ADMIN — ASPIRIN 81 MG: 81 TABLET, COATED ORAL at 08:11

## 2021-04-15 RX ADMIN — ATORVASTATIN CALCIUM 40 MG: 40 TABLET, FILM COATED ORAL at 08:11

## 2021-04-15 ASSESSMENT — ACTIVITIES OF DAILY LIVING (ADL)
LAUNDRY: UNABLE TO COMPLETE
DRESS: STREET CLOTHES;SCRUBS (BEHAVIORAL HEALTH)
LAUNDRY: WITH SUPERVISION
HYGIENE/GROOMING: INDEPENDENT
ORAL_HYGIENE: INDEPENDENT
HYGIENE/GROOMING: INDEPENDENT
DRESS: INDEPENDENT
ORAL_HYGIENE: INDEPENDENT

## 2021-04-15 ASSESSMENT — MIFFLIN-ST. JEOR: SCORE: 1587.76

## 2021-04-15 NOTE — PLAN OF CARE
Work Completed: The patient's care was discussed with the treatment team and chart notes were reviewed. Patient will discharge tomorrow. Writer completed AVS.    Discharge plan or goal: Home                Barriers to discharge: Stabilization of auditory hallucinations.

## 2021-04-15 NOTE — PLAN OF CARE
"  Pt continues to be withdrawn from peers. Affect blunted. Eye contact appropriate. Pleasant & cooperative. Pt reported feeling anxious, restless at HS. Requested and was given gabapentin 300 mg po PRN with scheduled medications. Reported AH this evening stating, \"Yeah, I've had some tonight.\" Requested additional medication to relieve AH, but was encouraged to wait some more time after having taken scheduled Haldol 10 mg po to see if it would relieve his symptoms first. Pt verbalized agreement with this plan. Continue with current treatment plan and recommendations. Continue to monitor and reassess symptoms. Monitor response to medications. Monitor progress towards treatment goals. Encourage groups and participation.   "

## 2021-04-15 NOTE — PLAN OF CARE
"Pt's sister Cathy [phone], DANNIE signed called for pt updated.     Per Cathy:   Voices and ringing in his ear were getting worse and pt was not able to sleep. Pt is \"literal\" and doesn't believe he has schizophrenia. He believes someone is doing this to him. Pt will take his antipsychotic medication for a while and stop. Pt will say he is taking medication because he is taking other medications just not his antipsychotic medication. This is the 6 or 7th time pt's has stopped taking his antipsychotics.     Cathy feels pt would be medication compliant on a long acting injectable antipsychotic. Pt is agreeable to this if his insurance will cover the cost and if it will not extend is hospital stay. Pt's new goal is to discharge Saturday instead of Friday because he did not get restful sleep last night. Pt is unsure when he stopped taking his PO Abilify. Pt reports there is a clinic 14 miles from his house that administers long acting injectables antipsychotics, [facility] in [place], [place]    Pt isolated and withdrawn, pt out for meals, pt denies depression, SI/SIB and hallucinations, pt endorse anxiety. Pt encouraged to be out of his room more, pt verbalized understanding.   "

## 2021-04-15 NOTE — PROGRESS NOTES
[facility], [place]   Psychiatric Progress Note        Interim History:   From H&P: The patient has a complex psychiatric history of paranoid psychosis, schizophrenia, history of stroke in 2012.  He came to the emergency room because he has been having increasing auditory hallucinations.  He stopped taking his medications 3 months ago and restarted them, but the voices have been increasing.  He is not able to sleep.  He made an appointment with his psychiatrist Tuesday and was restarted on his medications and Abilify injection was added, but he has not yet received it.  Because he is not able to sleep and he was pacing, he came to the emergency room.  He experiences following symptoms:  He has auditory hallucinations.  These are accusing him of past deeds, very disruptive.    Team meeting report: The patient's care was discussed with the treatment team during the daily team meeting and/or staff's chart notes were reviewed.  Staff report patient has been calm, pleasant, cooperative. Patient is attending groups and participating appropriately. Patient is eating well and taking medications as prescribed.  Affect is flat, brightens on approach.  Denies ringing in the ears.  Complained of anxiety and increase auditory hallucinations. Received prn Gabapentin and Zyprexa with good results.  Slept all night.     Met with patient.  He is in bed sleeping.  Denies hallucinations today.  Anxiety is still elevated.  Denies depression.  No suicidal ideation.  Discussed medication changes.  Patient is agreeable.  Still wants to proceed with discharge tomorrow which will depend on how today goes.  No questions or concerns.   Patient's sister requested that he starts the injectable Abilify.  Spoke with the pharmacist.  Medication is nonformulary and cannot be started in the hospital.  He will need to be on oral Abilify for about 2 weeks before starting the injection and 2 weeks after. Will discuss with  patient tomorrow.          Medications:       ARIPiprazole  20 mg Oral QAM     aspirin  81 mg Oral Daily     atorvastatin  40 mg Oral Daily     clopidogrel  75 mg Oral or NG Tube Daily     diphenhydrAMINE  50 mg Oral At Bedtime     OLANZapine  10 mg Oral At Bedtime     OLANZapine  5 mg Oral QAM     omeprazole  20 mg Oral QAM AC          Allergies:   No Known Allergies       Labs:     Recent Results (from the past 672 hour(s))   Drug abuse screen 6 urine (chem dep)    Collection Time: 04/11/21 12:37 AM   Result Value Ref Range    Amphetamine Qual Urine Negative NEG^Negative    Barbiturates Qual Urine Negative NEG^Negative    Benzodiazepine Qual Urine Negative NEG^Negative    Cannabinoids Qual Urine Negative NEG^Negative    Cocaine Qual Urine Negative NEG^Negative    Ethanol Qual Urine Negative NEG^Negative    Opiates Qualitative Urine Negative NEG^Negative   Asymptomatic Influenza A/B & SARS-CoV2 (COVID-19) Virus PCR Multiplex    Collection Time: 04/11/21  1:50 AM    Specimen: Nasopharyngeal   Result Value Ref Range    Flu A/B & SARS-COV-2 PCR Source Nasopharyngeal     SARS-CoV-2 PCR Result NEGATIVE     Influenza A PCR Negative NEG^Negative    Influenza B PCR Negative NEG^Negative    Respiratory Syncytial Virus PCR (Note)     Flu A/B & SARS-CoV-2 PCR Comment (Note)    CBC with platelets differential    Collection Time: 04/11/21  2:09 AM   Result Value Ref Range    WBC 6.7 4.0 - 11.0 10e9/L    RBC Count 5.40 4.4 - 5.9 10e12/L    Hemoglobin 14.8 13.3 - 17.7 g/dL    Hematocrit 46.1 40.0 - 53.0 %    MCV 85 78 - 100 fl    MCH 27.4 26.5 - 33.0 pg    MCHC 32.1 31.5 - 36.5 g/dL    RDW 14.0 10.0 - 15.0 %    Platelet Count 189 150 - 450 10e9/L    Diff Method Automated Method     % Neutrophils 61.3 %    % Lymphocytes 23.3 %    % Monocytes 9.1 %    % Eosinophils 4.9 %    % Basophils 1.0 %    % Immature Granulocytes 0.4 %    Nucleated RBCs 0 0 /100    Absolute Neutrophil 4.1 1.6 - 8.3 10e9/L    Absolute Lymphocytes 1.6 0.8 - 5.3  10e9/L    Absolute Monocytes 0.6 0.0 - 1.3 10e9/L    Absolute Eosinophils 0.3 0.0 - 0.7 10e9/L    Absolute Basophils 0.1 0.0 - 0.2 10e9/L    Abs Immature Granulocytes 0.0 0 - 0.4 10e9/L    Absolute Nucleated RBC 0.0    Basic metabolic panel    Collection Time: 04/11/21  2:09 AM   Result Value Ref Range    Sodium 140 133 - 144 mmol/L    Potassium 4.4 3.4 - 5.3 mmol/L    Chloride 109 94 - 109 mmol/L    Carbon Dioxide 24 20 - 32 mmol/L    Anion Gap 7 3 - 14 mmol/L    Glucose 100 (H) 70 - 99 mg/dL    Urea Nitrogen 22 7 - 30 mg/dL    Creatinine 0.95 0.66 - 1.25 mg/dL    GFR Estimate 83 >60 mL/min/[1.73_m2]    GFR Estimate If Black >90 >60 mL/min/[1.73_m2]    Calcium 8.6 8.5 - 10.1 mg/dL   EKG 12-lead, complete    Collection Time: 04/11/21  9:14 PM   Result Value Ref Range    Interpretation ECG Click View Image link to view waveform and result    TSH with free T4 reflex and/or T3 as indicated    Collection Time: 04/12/21  7:11 AM   Result Value Ref Range    TSH 0.95 0.40 - 4.00 mU/L   Vitamin D    Collection Time: 04/12/21  7:11 AM   Result Value Ref Range    Vitamin D Deficiency screening 38 20 - 75 ug/L   Vitamin B12    Collection Time: 04/12/21  7:11 AM   Result Value Ref Range    Vitamin B12 768 193 - 986 pg/mL   Folate    Collection Time: 04/12/21  7:11 AM   Result Value Ref Range    Folate 8.8 >5.4 ng/mL   Hepatic panel    Collection Time: 04/12/21  7:11 AM   Result Value Ref Range    Bilirubin Direct 0.2 0.0 - 0.2 mg/dL    Bilirubin Total 0.6 0.2 - 1.3 mg/dL    Albumin 3.6 3.4 - 5.0 g/dL    Protein Total 6.8 6.8 - 8.8 g/dL    Alkaline Phosphatase 111 40 - 150 U/L    ALT 24 0 - 70 U/L    AST 18 0 - 45 U/L   Treponema Abs w Reflex to RPR and Titer    Collection Time: 04/12/21  7:11 AM   Result Value Ref Range    Treponema Antibodies Nonreactive NR^Nonreactive            Psychiatric Examination:   Temp: 97.3  F (36.3  C) Temp src: Temporal BP: 124/82 Pulse: 70   Resp: 17 SpO2: 99 % O2 Device: None (Room air)    Weight  is 176 lbs 11.2 oz  Body mass index is 25.35 kg/m .    Appearance: adequately groomed, awake, cooperative, mild distress and normal weight  Attitude:  cooperative  Eye Contact:  good  Mood:  anxious  Affect:  mood congruent  Speech:  dysarthria and pressured speech  Psychomotor Behavior:  no evidence of tardive dyskinesia, dystonia, or tics  Throught Process:  linear, disorganized, illogical and tangental  Associations:  no loose associations  Thought Content:  no evidence of suicidal ideation or homicidal ideation and reports AH, derogatory in nature.   Insight:  limited  Judgement:  limited  Oriented to:  time, person, and place  Attention Span and Concentration:  limited  Recent and Remote Memory:  fair         Precautions:     Behavioral Orders   Procedures     Code 1 - Restrict to Unit     Fall precautions     Routine Programming     As clinically indicated     Status 15     Every 15 minutes.          DIagnoses:   1.  Paranoid schizophrenia, recurrent, severe, with psychosis  2.  History of stroke in 2012  3.  Additional medical problems include: GERD, dysphagia, hyperlipidemia         Plan:   --Change Abilify back to 20 mg at am  --Discontinue Haldol.  --Start Zyprexa 5 mg, qam and 10 mg, qhs  --Benadryl 50 mg at bedtime  --Additional as needed medications will include gabapentin, Zyprexa, and trazodone    --Blood work was reviewed  --Internal medicine follow-up for medical problems     Disposition Plan   Reason for ongoing admission: is unable to care for self due to psychosis  Disposition: Home  Estimated length of stay: 7 to 10 days  Legal Status: Voluntary  Discharge will be granted once symptoms improved.    Donaldo ORNELAS CNP  Date: 04/15/21  Time: 1:35 PM      This note was created with the help of Dragon dictation system. All grammatical/typing errors or context distortion are unintentional and inherent to software.    More than 30 minutes were spent for assessment, documentation, and  coordination of care.

## 2021-04-16 PROCEDURE — 124N000003 HC R&B MH SENIOR/ADOLESCENT

## 2021-04-16 PROCEDURE — 99233 SBSQ HOSP IP/OBS HIGH 50: CPT | Performed by: NURSE PRACTITIONER

## 2021-04-16 PROCEDURE — 250N000013 HC RX MED GY IP 250 OP 250 PS 637: Performed by: PSYCHIATRY & NEUROLOGY

## 2021-04-16 PROCEDURE — 250N000013 HC RX MED GY IP 250 OP 250 PS 637: Performed by: EMERGENCY MEDICINE

## 2021-04-16 PROCEDURE — G0177 OPPS/PHP; TRAIN & EDUC SERV: HCPCS

## 2021-04-16 PROCEDURE — 250N000013 HC RX MED GY IP 250 OP 250 PS 637: Performed by: NURSE PRACTITIONER

## 2021-04-16 RX ADMIN — OLANZAPINE 10 MG: 10 TABLET, FILM COATED ORAL at 20:46

## 2021-04-16 RX ADMIN — ASPIRIN 81 MG: 81 TABLET, COATED ORAL at 08:40

## 2021-04-16 RX ADMIN — DIPHENHYDRAMINE HYDROCHLORIDE 50 MG: 25 CAPSULE ORAL at 20:46

## 2021-04-16 RX ADMIN — OLANZAPINE 5 MG: 5 TABLET, FILM COATED ORAL at 08:40

## 2021-04-16 RX ADMIN — ATORVASTATIN CALCIUM 40 MG: 40 TABLET, FILM COATED ORAL at 08:40

## 2021-04-16 RX ADMIN — CLOPIDOGREL BISULFATE 75 MG: 75 TABLET, FILM COATED ORAL at 08:40

## 2021-04-16 RX ADMIN — OMEPRAZOLE 20 MG: 20 CAPSULE, DELAYED RELEASE ORAL at 06:58

## 2021-04-16 RX ADMIN — ARIPIPRAZOLE 20 MG: 20 TABLET ORAL at 08:40

## 2021-04-16 ASSESSMENT — ACTIVITIES OF DAILY LIVING (ADL)
LAUNDRY: UNABLE TO COMPLETE
HYGIENE/GROOMING: INDEPENDENT
DRESS: INDEPENDENT
ORAL_HYGIENE: INDEPENDENT

## 2021-04-16 NOTE — PLAN OF CARE
"Pt verbalized he slept \"better than yesterday\", pt denies feeling rested after waking up. Pt asked if he was hearing voices, pt shrugged, question repeated pt verbalized \"very little\". Pt's goal is to discharge tomorrow, pt is agreeable to staying until Monday. Affect flat, mood is calm. Pt not social with staff and peers. Pt eating and taking fluids.    Fluids and activities encouraged, pt verbalized understanding. AM /75 P 65, pt ladan feeling dizzy or light headed, pt verbalized he will tell staff if he does feel dizzy or lightheaded, gait steady and slow, BP recheck 113/85 P 76.   "

## 2021-04-16 NOTE — PROGRESS NOTES
"[facility], [place]   Psychiatric Progress Note        Interim History:   From H&P: The patient has a complex psychiatric history of paranoid psychosis, schizophrenia, history of stroke in 2012.  He came to the emergency room because he has been having increasing auditory hallucinations.  He stopped taking his medications 3 months ago and restarted them, but the voices have been increasing.  He is not able to sleep.  He made an appointment with his psychiatrist Tuesday and was restarted on his medications and Abilify injection was added, but he has not yet received it.  Because he is not able to sleep and he was pacing, he came to the emergency room.  He experiences following symptoms:  He has auditory hallucinations.  These are accusing him of past deeds, very disruptive.    Team meeting report: The patient's care was discussed with the treatment team during the daily team meeting and/or staff's chart notes were reviewed.  Staff report patient has been calm, pleasant, cooperative. Did not attend group yesterday. Endorsed some anxiety, denied AH last evening, but reports \"very little\" this morning. Slept 11.5 hours.      Met with patient.  He states he slept better last night, but has not felt rested when waking the past few days. He denies depression but is a little anxious. Appears less anxious than yesterday. Describes AH as \"faint\". He mentioned concern that he will be laying in bed, \"with my eyes closed and I am thinking about something, and I'll [physically] reach out for it but it's not there.\"  Asked if he thought it was his imagination and he said, \"yes, probably.\" Discussed injectable Abilify after discharge and need to stay on oral Abilify for 2 weeks following injection. He is agreeable. Also agreeable to stay over the weekend and discharge Monday.    Spoke with patient's Sister Darby phone #[phone].  Discussed the Abilify injection and what needs to be done.  " Darby states that the patient has an appointment with his psychiatric nurse practitioner next Thursday at [facility] in [place], [place] intake and wanted the injection.  States that the patient has a history of noncompliance with his medications.  She is convinced that he does not have schizophrenia and that somebody else is doing this to him.  Believes that he will do better if he is on Depo medication.         Medications:       ARIPiprazole  20 mg Oral QAM     aspirin  81 mg Oral Daily     atorvastatin  40 mg Oral Daily     clopidogrel  75 mg Oral or NG Tube Daily     diphenhydrAMINE  50 mg Oral At Bedtime     OLANZapine  10 mg Oral At Bedtime     OLANZapine  5 mg Oral QAM     omeprazole  20 mg Oral QAM AC          Allergies:   No Known Allergies       Labs:     Recent Results (from the past 672 hour(s))   Drug abuse screen 6 urine (chem dep)    Collection Time: 04/11/21 12:37 AM   Result Value Ref Range    Amphetamine Qual Urine Negative NEG^Negative    Barbiturates Qual Urine Negative NEG^Negative    Benzodiazepine Qual Urine Negative NEG^Negative    Cannabinoids Qual Urine Negative NEG^Negative    Cocaine Qual Urine Negative NEG^Negative    Ethanol Qual Urine Negative NEG^Negative    Opiates Qualitative Urine Negative NEG^Negative   Asymptomatic Influenza A/B & SARS-CoV2 (COVID-19) Virus PCR Multiplex    Collection Time: 04/11/21  1:50 AM    Specimen: Nasopharyngeal   Result Value Ref Range    Flu A/B & SARS-COV-2 PCR Source Nasopharyngeal     SARS-CoV-2 PCR Result NEGATIVE     Influenza A PCR Negative NEG^Negative    Influenza B PCR Negative NEG^Negative    Respiratory Syncytial Virus PCR (Note)     Flu A/B & SARS-CoV-2 PCR Comment (Note)    CBC with platelets differential    Collection Time: 04/11/21  2:09 AM   Result Value Ref Range    WBC 6.7 4.0 - 11.0 10e9/L    RBC Count 5.40 4.4 - 5.9 10e12/L    Hemoglobin 14.8 13.3 - 17.7 g/dL    Hematocrit 46.1 40.0 - 53.0 %    MCV 85 78 - 100 fl    MCH 27.4 26.5 - 33.0 pg     MCHC 32.1 31.5 - 36.5 g/dL    RDW 14.0 10.0 - 15.0 %    Platelet Count 189 150 - 450 10e9/L    Diff Method Automated Method     % Neutrophils 61.3 %    % Lymphocytes 23.3 %    % Monocytes 9.1 %    % Eosinophils 4.9 %    % Basophils 1.0 %    % Immature Granulocytes 0.4 %    Nucleated RBCs 0 0 /100    Absolute Neutrophil 4.1 1.6 - 8.3 10e9/L    Absolute Lymphocytes 1.6 0.8 - 5.3 10e9/L    Absolute Monocytes 0.6 0.0 - 1.3 10e9/L    Absolute Eosinophils 0.3 0.0 - 0.7 10e9/L    Absolute Basophils 0.1 0.0 - 0.2 10e9/L    Abs Immature Granulocytes 0.0 0 - 0.4 10e9/L    Absolute Nucleated RBC 0.0    Basic metabolic panel    Collection Time: 04/11/21  2:09 AM   Result Value Ref Range    Sodium 140 133 - 144 mmol/L    Potassium 4.4 3.4 - 5.3 mmol/L    Chloride 109 94 - 109 mmol/L    Carbon Dioxide 24 20 - 32 mmol/L    Anion Gap 7 3 - 14 mmol/L    Glucose 100 (H) 70 - 99 mg/dL    Urea Nitrogen 22 7 - 30 mg/dL    Creatinine 0.95 0.66 - 1.25 mg/dL    GFR Estimate 83 >60 mL/min/[1.73_m2]    GFR Estimate If Black >90 >60 mL/min/[1.73_m2]    Calcium 8.6 8.5 - 10.1 mg/dL   EKG 12-lead, complete    Collection Time: 04/11/21  9:14 PM   Result Value Ref Range    Interpretation ECG Click View Image link to view waveform and result    TSH with free T4 reflex and/or T3 as indicated    Collection Time: 04/12/21  7:11 AM   Result Value Ref Range    TSH 0.95 0.40 - 4.00 mU/L   Vitamin D    Collection Time: 04/12/21  7:11 AM   Result Value Ref Range    Vitamin D Deficiency screening 38 20 - 75 ug/L   Vitamin B12    Collection Time: 04/12/21  7:11 AM   Result Value Ref Range    Vitamin B12 768 193 - 986 pg/mL   Folate    Collection Time: 04/12/21  7:11 AM   Result Value Ref Range    Folate 8.8 >5.4 ng/mL   Hepatic panel    Collection Time: 04/12/21  7:11 AM   Result Value Ref Range    Bilirubin Direct 0.2 0.0 - 0.2 mg/dL    Bilirubin Total 0.6 0.2 - 1.3 mg/dL    Albumin 3.6 3.4 - 5.0 g/dL    Protein Total 6.8 6.8 - 8.8 g/dL    Alkaline  Phosphatase 111 40 - 150 U/L    ALT 24 0 - 70 U/L    AST 18 0 - 45 U/L   Treponema Abs w Reflex to RPR and Titer    Collection Time: 04/12/21  7:11 AM   Result Value Ref Range    Treponema Antibodies Nonreactive NR^Nonreactive            Psychiatric Examination:   Temp: 97.9  F (36.6  C) Temp src: Temporal BP: 109/75 Pulse: 65   Resp: 16 SpO2: 97 % O2 Device: None (Room air)    Weight is 176 lbs 11.2 oz  Body mass index is 25.35 kg/m .    Appearance: adequately groomed, awake, cooperative, mild distress and normal weight  Attitude:  cooperative  Eye Contact:  good  Mood:  anxious  Affect:  mood congruent  Speech:  dysarthria and pressured speech  Psychomotor Behavior:  no evidence of tardive dyskinesia, dystonia, or tics  Throught Process:  linear, disorganized, illogical and tangental  Associations:  no loose associations  Thought Content:  no evidence of suicidal ideation or homicidal ideation and reports AH, derogatory in nature.   Insight:  limited  Judgement:  limited  Oriented to:  time, person, and place  Attention Span and Concentration:  limited  Recent and Remote Memory:  fair         Precautions:     Behavioral Orders   Procedures     Code 1 - Restrict to Unit     Fall precautions     Routine Programming     As clinically indicated     Status 15     Every 15 minutes.          DIagnoses:   1.  Paranoid schizophrenia, recurrent, severe, with psychosis  2.  History of stroke in 2012  3.  Additional medical problems include: GERD, dysphagia, hyperlipidemia         Plan:   --Change Abilify back to 20 mg at am  --Discontinue Haldol.  --Start Zyprexa 5 mg, qam and 10 mg, qhs  --Benadryl 50 mg at bedtime  --Additional as needed medications will include gabapentin, Zyprexa, and trazodone    --Blood work was reviewed  --Internal medicine follow-up for medical problems     Disposition Plan   Reason for ongoing admission: is unable to care for self due to psychosis  Disposition: Home  Estimated length of stay: 7 to  10 days  Legal Status: Voluntary  Discharge will be granted once symptoms improved.    Donaldo ORNELAS CNP  Date: 04/16/21  Time: 1:22 PM      This note was created with the help of Dragon dictation system. All grammatical/typing errors or context distortion are unintentional and inherent to software.    More than 30 minutes were spent for assessment, documentation, and coordination of care.

## 2021-04-16 NOTE — PLAN OF CARE
Problem: OT General Care Plan  Goal: OT Goal 1  Description: Will participate in opportunities to assess and build skills in organizing thoughts and to enhance comfort with others.   Note: Attended 2 of 2 OT groups.  During the goal oriented task group he participated for 55 minutes with 9 patients.  He worked on several activities on the iPad, problem solving affective solutions independently.  He was pleasant on approach though worked quietly. He was quick to be involved and participated for 50 minutes with 8 patients in an activity focused on identifying helpful ideas for calming using the 5 senses, and practicing a grounding technique with this focus.  Ideas were in context and creative with insightful answers.  He appeared attentive, involved and willing to participate.

## 2021-04-16 NOTE — DISCHARGE INSTRUCTIONS
"Behavioral Discharge Planning and Instructions    Summary: You were admitted on 4/10/2021  due to Psychotic Symptomology.  You were treated by Donaldo ORNELAS DNP and discharged on 4/19/2021 from [facility] to Home    Main Diagnosis:   1.  Paranoid schizophrenia, recurrent, severe, with psychosis  2.  History of stroke in 2012  3.  Additional medical problems include: GERD, dysphagia, hyperlipidemia    Health Care Follow-up:   Psychiatry appointment: Thursday, April 22 at 10:30 am in clinic  Provider:Jody Schoenecker, NP   [facility]  [address]  [place] [address]  [phone]    Attend all scheduled appointments with your outpatient providers. Call at least 24 hours in advance if you need to reschedule an appointment to ensure continued access to your outpatient providers.     Major Treatments, Procedures and Findings:  You were provided with: a psychiatric assessment, assessed for medical stability, medication evaluation and/or management, group therapy, milieu management and medical interventions    Symptoms to Report: feeling more aggressive, increased confusion, losing more sleep, mood getting worse or thoughts of suicide    Early warning signs can include: increased depression or anxiety sleep disturbances increased thoughts or behaviors of suicide or self-harm  increased unusual thinking, such as paranoia or hearing voices    Safety and Wellness:  Take all medicines as directed.  Make no changes unless your doctor suggests them.   Follow treatment recommendations.  Refrain from alcohol and non-prescribed drugs.  If there is a concern for safety, call 911.    Resources:   Crisis Intervention: 552.137.1800 or 731-425-1142 (TTY: 765.245.5658).  Call anytime for help.  [employer] (www.mn.essence.org): 924.857.4418 or 025-459-8632.  Text 4 Life: txt \"LIFE\" to 35918 for immediate support and crisis intervention    General Medication Instructions:   See your medication sheet(s) for " instructions.   Take all medicines as directed.  Make no changes unless your doctor suggests them.   Go to all your doctor visits.  Be sure to have all your required lab tests. This way, your medicines can be refilled on time.  Do not use any drugs not prescribed by your doctor.  Avoid alcohol.    Advance Directives:   Scanned document on file with [employer]? No scanned doc  Is document scanned? Pt states no documents  Honoring Choices Your Rights Handout: Informed and given  Was more information offered? Facilitator need-Consult order    The Treatment team has appreciated the opportunity to work with you. If you have any questions or concerns about your recent admission, you can contact the unit which can receive your call 24 hours a day, 7 days a week. They will be able to get in touch with a Provider if needed. The unit number is [phone] .

## 2021-04-16 NOTE — PLAN OF CARE
Pt presents with blunted, flat affect and calm mood. Denies SI/SIB and hallucinations currently. Rates depression and anxiety 2/10. Reports that he hopes that things continue to go well so that he may possible discharge on Saturday. Pt remained isolated to his room most of the shift, did come out to eat dinner. Appetite and fluid intake adequate. Denies pain. VSS. Medication compliant. No other concerns or complaints noted.

## 2021-04-16 NOTE — PLAN OF CARE
Work Completed: The patient's care was discussed with the treatment team and chart notes were reviewed. Discharge date was changed to Monday. Patient will start on injectable medication when he sees his outpatient provider next week. AVS is complete.    Discharge plan or goal: Home                Barriers to discharge: Stabilization of psychotic sx.

## 2021-04-17 PROCEDURE — 250N000013 HC RX MED GY IP 250 OP 250 PS 637: Performed by: PSYCHIATRY & NEUROLOGY

## 2021-04-17 PROCEDURE — 124N000003 HC R&B MH SENIOR/ADOLESCENT

## 2021-04-17 PROCEDURE — 250N000013 HC RX MED GY IP 250 OP 250 PS 637: Performed by: NURSE PRACTITIONER

## 2021-04-17 PROCEDURE — 250N000013 HC RX MED GY IP 250 OP 250 PS 637: Performed by: EMERGENCY MEDICINE

## 2021-04-17 RX ADMIN — ATORVASTATIN CALCIUM 40 MG: 40 TABLET, FILM COATED ORAL at 08:24

## 2021-04-17 RX ADMIN — CLOPIDOGREL BISULFATE 75 MG: 75 TABLET, FILM COATED ORAL at 08:24

## 2021-04-17 RX ADMIN — DIPHENHYDRAMINE HYDROCHLORIDE 50 MG: 25 CAPSULE ORAL at 20:46

## 2021-04-17 RX ADMIN — OLANZAPINE 10 MG: 10 TABLET, FILM COATED ORAL at 20:46

## 2021-04-17 RX ADMIN — ASPIRIN 81 MG: 81 TABLET, COATED ORAL at 08:24

## 2021-04-17 RX ADMIN — ARIPIPRAZOLE 20 MG: 20 TABLET ORAL at 08:24

## 2021-04-17 RX ADMIN — OMEPRAZOLE 20 MG: 20 CAPSULE, DELAYED RELEASE ORAL at 07:10

## 2021-04-17 RX ADMIN — OLANZAPINE 5 MG: 5 TABLET, FILM COATED ORAL at 08:24

## 2021-04-17 ASSESSMENT — ACTIVITIES OF DAILY LIVING (ADL)
DRESS: INDEPENDENT
ORAL_HYGIENE: INDEPENDENT
HYGIENE/GROOMING: SHOWER
HYGIENE/GROOMING: INDEPENDENT
LAUNDRY: WITH SUPERVISION

## 2021-04-17 NOTE — PLAN OF CARE
Pt isolative to room. Affect flat. Eye contact appropriate. Denies depression, anxiety, SI. Denied hallucinations this evening. Expresses agreement with plan to discharge on Monday. Appears disheveled. Pleasant, cooperative, med-compliant. Continue with current treatment plan and recommendations. Continue to monitor and reassess symptoms. Monitor response to medications. Monitor progress towards treatment goals. Encourage groups and participation.

## 2021-04-17 NOTE — PLAN OF CARE
Patient appeared calm with blunted flat affect. He is isolative and withdrawn. He reports feeling better, is medication complaint. Patient denies auditory/visual hallucination, denies SI/SIB.

## 2021-04-18 LAB
LABORATORY COMMENT REPORT: NORMAL
SARS-COV-2 RNA RESP QL NAA+PROBE: NEGATIVE
SPECIMEN SOURCE: NORMAL

## 2021-04-18 PROCEDURE — 250N000013 HC RX MED GY IP 250 OP 250 PS 637: Performed by: EMERGENCY MEDICINE

## 2021-04-18 PROCEDURE — 250N000013 HC RX MED GY IP 250 OP 250 PS 637: Performed by: NURSE PRACTITIONER

## 2021-04-18 PROCEDURE — 250N000013 HC RX MED GY IP 250 OP 250 PS 637: Performed by: PSYCHIATRY & NEUROLOGY

## 2021-04-18 PROCEDURE — 87635 SARS-COV-2 COVID-19 AMP PRB: CPT | Performed by: NURSE PRACTITIONER

## 2021-04-18 PROCEDURE — 124N000003 HC R&B MH SENIOR/ADOLESCENT

## 2021-04-18 RX ORDER — GABAPENTIN 100 MG/1
100-300 CAPSULE ORAL 3 TIMES DAILY PRN
Qty: 60 CAPSULE | Refills: 0 | Status: ON HOLD | OUTPATIENT
Start: 2021-04-18 | End: 2022-06-11

## 2021-04-18 RX ORDER — ATORVASTATIN CALCIUM 40 MG/1
40 TABLET, FILM COATED ORAL DAILY
Qty: 30 TABLET | Refills: 0 | Status: ON HOLD | OUTPATIENT
Start: 2021-04-18 | End: 2022-06-11

## 2021-04-18 RX ORDER — DIPHENHYDRAMINE HCL 50 MG
50 CAPSULE ORAL AT BEDTIME
Qty: 30 CAPSULE | Refills: 0 | Status: SHIPPED | OUTPATIENT
Start: 2021-04-18 | End: 2021-05-18

## 2021-04-18 RX ORDER — CLOPIDOGREL BISULFATE 75 MG/1
75 TABLET ORAL DAILY
Qty: 30 TABLET | Refills: 0 | Status: ON HOLD | OUTPATIENT
Start: 2021-04-18 | End: 2022-06-11

## 2021-04-18 RX ORDER — OLANZAPINE 5 MG/1
5 TABLET ORAL EVERY MORNING
Qty: 30 TABLET | Refills: 0 | Status: SHIPPED | OUTPATIENT
Start: 2021-04-19 | End: 2021-05-19

## 2021-04-18 RX ORDER — ARIPIPRAZOLE 20 MG/1
20 TABLET ORAL EVERY MORNING
Qty: 30 TABLET | Refills: 0 | Status: SHIPPED | OUTPATIENT
Start: 2021-04-19 | End: 2021-05-19

## 2021-04-18 RX ORDER — OLANZAPINE 10 MG/1
10 TABLET ORAL AT BEDTIME
Qty: 30 TABLET | Refills: 0 | Status: SHIPPED | OUTPATIENT
Start: 2021-04-18 | End: 2021-05-18

## 2021-04-18 RX ADMIN — OLANZAPINE 5 MG: 5 TABLET, FILM COATED ORAL at 08:22

## 2021-04-18 RX ADMIN — ASPIRIN 81 MG: 81 TABLET, COATED ORAL at 08:22

## 2021-04-18 RX ADMIN — OLANZAPINE 10 MG: 10 TABLET, FILM COATED ORAL at 21:08

## 2021-04-18 RX ADMIN — OMEPRAZOLE 20 MG: 20 CAPSULE, DELAYED RELEASE ORAL at 06:40

## 2021-04-18 RX ADMIN — DIPHENHYDRAMINE HYDROCHLORIDE 50 MG: 25 CAPSULE ORAL at 21:08

## 2021-04-18 RX ADMIN — CLOPIDOGREL BISULFATE 75 MG: 75 TABLET, FILM COATED ORAL at 08:22

## 2021-04-18 RX ADMIN — ARIPIPRAZOLE 20 MG: 20 TABLET ORAL at 08:22

## 2021-04-18 RX ADMIN — ATORVASTATIN CALCIUM 40 MG: 40 TABLET, FILM COATED ORAL at 08:22

## 2021-04-18 ASSESSMENT — MIFFLIN-ST. JEOR: SCORE: 1583.22

## 2021-04-18 NOTE — PLAN OF CARE
Pt continues to isolate to room, bed. Visible in lounge for dinner meal. Appetite good. Fluid intake good. Reports improved sleep overall. Requested to take a shower and did so independently. Makes appropriate eye contact during interactions. Pleasant, calm, cooperative, med-compliant. Reports he thinks Zyprexa is alleviating his hallucinations better than haldol did. Denies AH this evening. Declined to attend therapeutic group. Continue with current treatment plan and recommendations. Continue to monitor and reassess symptoms. Monitor response to medications. Monitor progress towards treatment goals. Encourage groups and participation.

## 2021-04-18 NOTE — PLAN OF CARE
Patient has been calm, withdrawn and isolative to room most of the shift. He denies symptoms of depression, anxiety, hallucinations and SI/SIB. Patient is medication complaint and is looking forward to discharging tomorrow. He reports sister will come to pick him up tomorrow at 10 am.

## 2021-04-18 NOTE — PLAN OF CARE
Pt continues to isolate to room, bed. Visible in lounge for dinner meal. Appetite good. Fluid intake good. Continues to report good sleep. Affect blunted. Makes appropriate eye contact during interactions. Pleasant, calm, cooperative, med-compliant. Denies AH this evening. Declines to attend therapeutic group. Continue with current treatment plan and recommendations. Continue to monitor and reassess symptoms. Monitor response to medications. Monitor progress towards treatment goals. Encourage groups and participation.

## 2021-04-19 VITALS
RESPIRATION RATE: 16 BRPM | OXYGEN SATURATION: 96 % | TEMPERATURE: 97.3 F | HEART RATE: 75 BPM | WEIGHT: 175.7 LBS | HEIGHT: 70 IN | BODY MASS INDEX: 25.15 KG/M2 | SYSTOLIC BLOOD PRESSURE: 122 MMHG | DIASTOLIC BLOOD PRESSURE: 84 MMHG

## 2021-04-19 PROCEDURE — 250N000013 HC RX MED GY IP 250 OP 250 PS 637: Performed by: NURSE PRACTITIONER

## 2021-04-19 PROCEDURE — 250N000013 HC RX MED GY IP 250 OP 250 PS 637: Performed by: EMERGENCY MEDICINE

## 2021-04-19 PROCEDURE — 99238 HOSP IP/OBS DSCHRG MGMT 30/<: CPT | Performed by: NURSE PRACTITIONER

## 2021-04-19 RX ADMIN — CLOPIDOGREL BISULFATE 75 MG: 75 TABLET, FILM COATED ORAL at 08:37

## 2021-04-19 RX ADMIN — OLANZAPINE 5 MG: 5 TABLET, FILM COATED ORAL at 08:37

## 2021-04-19 RX ADMIN — OMEPRAZOLE 20 MG: 20 CAPSULE, DELAYED RELEASE ORAL at 07:05

## 2021-04-19 RX ADMIN — ARIPIPRAZOLE 20 MG: 20 TABLET ORAL at 08:37

## 2021-04-19 RX ADMIN — ASPIRIN 81 MG: 81 TABLET, COATED ORAL at 08:37

## 2021-04-19 RX ADMIN — ATORVASTATIN CALCIUM 40 MG: 40 TABLET, FILM COATED ORAL at 08:37

## 2021-04-19 ASSESSMENT — ACTIVITIES OF DAILY LIVING (ADL)
DRESS: INDEPENDENT
LAUNDRY: WITH SUPERVISION
HYGIENE/GROOMING: INDEPENDENT
ORAL_HYGIENE: INDEPENDENT

## 2021-04-19 NOTE — PLAN OF CARE
Pt denies suicidal ideation or homicidal ideation. Has received all belongings. Discharge medications and follow up instructions reviewed with patient. Patient verbalized understanding of discharge instructions.

## 2021-04-19 NOTE — PLAN OF CARE
Problem: General Plan of Care (Inpatient Behavioral)  Goal: Team Discussion  Description: Team Plan:  Outcome: Improving  Note: BEHAVIORAL TEAM DISCUSSION    Participants: Donaldo ORNELAS DNP, Devorah Emery CTC, Shaneka Pearson RN, Karen Sharp OT  Progress: Improving  Anticipated length of stay: Discharging today  Continued Stay Criteria/Rationale: Hallucinations  Medical/Physical: History of CVA  Precautions:   Behavioral Orders   Procedures     Code 1 - Restrict to Unit     Fall precautions     Routine Programming     As clinically indicated     Status 15     Every 15 minutes.     Plan: Psychiatric Assessment. Medication Management. Therapeutic Mileu. Individual and group support.   Rationale for change in precautions or plan: No change

## 2021-04-19 NOTE — PLAN OF CARE
Problem: Sleep Disturbance (Psychotic Signs/Symptoms)  Goal: Improved Sleep (Psychotic Signs/Symptoms)  Outcome: Adequate for Discharge   Patient slept for 8.5 hrs.  No concerns reported overnight.

## 2021-04-19 NOTE — DISCHARGE SUMMARY
Psychiatric Discharge Summary    Rajiv Rodriguez MRN# [id number]   Age: 65 year old YOB: 1955     Date of Admission:  4/10/2021  Date of Discharge:  4/19/2021  Admitting Provider:  Jan Murphy MD  Discharge Provider:  CECI Dillon CNP         Event Leading to Hospitalization:   The patient has a complex psychiatric history of paranoid psychosis, schizophrenia, history of stroke in 2012.  He came to the emergency room because he has been having increasing auditory hallucinations.  He stopped taking his medications 3 months ago and restarted them, but the voices have been increasing.  He is not able to sleep.  He made an appointment with his psychiatrist Tuesday and was restarted on his medications and Abilify injection was added, but he has not yet received it.  Because he is not able to sleep and he was pacing, he came to the emergency room.  He experiences following symptoms:  He has auditory hallucinations.  These are accusing him of past deeds, very disruptive.  It impacts his sleep  is restless with him.  They are not command in nature now, but they were in command in nature in the past.  He has thought insertion, but denies any thought broadcasting or thought deletion.  He has delusions of persecutions, delusions of control, but no delusions of reference.    See Admission note by Venkatesh Tom MD, on 4/11/2021 for additional details.          DIagnoses:   1.  Paranoid schizophrenia, recurrent, severe, with psychosis  2.  History of stroke in 2012  3.  Additional medical problems include: GERD, dysphagia, hyperlipidemia         Labs:     Recent Results (from the past 336 hour(s))   Drug abuse screen 6 urine (chem dep)    Collection Time: 04/11/21 12:37 AM   Result Value Ref Range    Amphetamine Qual Urine Negative NEG^Negative    Barbiturates Qual Urine Negative NEG^Negative    Benzodiazepine Qual Urine Negative NEG^Negative    Cannabinoids Qual Urine Negative NEG^Negative     Cocaine Qual Urine Negative NEG^Negative    Ethanol Qual Urine Negative NEG^Negative    Opiates Qualitative Urine Negative NEG^Negative   Asymptomatic Influenza A/B & SARS-CoV2 (COVID-19) Virus PCR Multiplex    Collection Time: 04/11/21  1:50 AM    Specimen: Nasopharyngeal   Result Value Ref Range    Flu A/B & SARS-COV-2 PCR Source Nasopharyngeal     SARS-CoV-2 PCR Result NEGATIVE     Influenza A PCR Negative NEG^Negative    Influenza B PCR Negative NEG^Negative    Respiratory Syncytial Virus PCR (Note)     Flu A/B & SARS-CoV-2 PCR Comment (Note)    CBC with platelets differential    Collection Time: 04/11/21  2:09 AM   Result Value Ref Range    WBC 6.7 4.0 - 11.0 10e9/L    RBC Count 5.40 4.4 - 5.9 10e12/L    Hemoglobin 14.8 13.3 - 17.7 g/dL    Hematocrit 46.1 40.0 - 53.0 %    MCV 85 78 - 100 fl    MCH 27.4 26.5 - 33.0 pg    MCHC 32.1 31.5 - 36.5 g/dL    RDW 14.0 10.0 - 15.0 %    Platelet Count 189 150 - 450 10e9/L    Diff Method Automated Method     % Neutrophils 61.3 %    % Lymphocytes 23.3 %    % Monocytes 9.1 %    % Eosinophils 4.9 %    % Basophils 1.0 %    % Immature Granulocytes 0.4 %    Nucleated RBCs 0 0 /100    Absolute Neutrophil 4.1 1.6 - 8.3 10e9/L    Absolute Lymphocytes 1.6 0.8 - 5.3 10e9/L    Absolute Monocytes 0.6 0.0 - 1.3 10e9/L    Absolute Eosinophils 0.3 0.0 - 0.7 10e9/L    Absolute Basophils 0.1 0.0 - 0.2 10e9/L    Abs Immature Granulocytes 0.0 0 - 0.4 10e9/L    Absolute Nucleated RBC 0.0    Basic metabolic panel    Collection Time: 04/11/21  2:09 AM   Result Value Ref Range    Sodium 140 133 - 144 mmol/L    Potassium 4.4 3.4 - 5.3 mmol/L    Chloride 109 94 - 109 mmol/L    Carbon Dioxide 24 20 - 32 mmol/L    Anion Gap 7 3 - 14 mmol/L    Glucose 100 (H) 70 - 99 mg/dL    Urea Nitrogen 22 7 - 30 mg/dL    Creatinine 0.95 0.66 - 1.25 mg/dL    GFR Estimate 83 >60 mL/min/[1.73_m2]    GFR Estimate If Black >90 >60 mL/min/[1.73_m2]    Calcium 8.6 8.5 - 10.1 mg/dL   EKG 12-lead, complete    Collection  Time: 04/11/21  9:14 PM   Result Value Ref Range    Interpretation ECG Click View Image link to view waveform and result    TSH with free T4 reflex and/or T3 as indicated    Collection Time: 04/12/21  7:11 AM   Result Value Ref Range    TSH 0.95 0.40 - 4.00 mU/L   Vitamin D    Collection Time: 04/12/21  7:11 AM   Result Value Ref Range    Vitamin D Deficiency screening 38 20 - 75 ug/L   Vitamin B12    Collection Time: 04/12/21  7:11 AM   Result Value Ref Range    Vitamin B12 768 193 - 986 pg/mL   Folate    Collection Time: 04/12/21  7:11 AM   Result Value Ref Range    Folate 8.8 >5.4 ng/mL   Hepatic panel    Collection Time: 04/12/21  7:11 AM   Result Value Ref Range    Bilirubin Direct 0.2 0.0 - 0.2 mg/dL    Bilirubin Total 0.6 0.2 - 1.3 mg/dL    Albumin 3.6 3.4 - 5.0 g/dL    Protein Total 6.8 6.8 - 8.8 g/dL    Alkaline Phosphatase 111 40 - 150 U/L    ALT 24 0 - 70 U/L    AST 18 0 - 45 U/L   Treponema Abs w Reflex to RPR and Titer    Collection Time: 04/12/21  7:11 AM   Result Value Ref Range    Treponema Antibodies Nonreactive NR^Nonreactive   Asymptomatic SARS-CoV-2 COVID-19 Virus (Coronavirus) by PCR    Collection Time: 04/18/21  9:15 PM    Specimen: Nasopharyngeal   Result Value Ref Range    SARS-CoV-2 Virus Specimen Source Nasopharyngeal     SARS-CoV-2 PCR Result NEGATIVE     SARS-CoV-2 PCR Comment (Note)               Consults:   Consultation during this admission received from internal medicine         Hospital Course:   Rajiv Rodriguez was admitted to [facility] [address] [place] [address] with attending Donaldo ORNELAS CNP, as a voluntary patient. The patient was placed under status 15 (15 minute checks) to ensure patient safety.     The following medication changes took place: Initially started Haldol, with minimal improvement, thus replaced with Zyprexa. Patient was able to sleep through the night. Hallucinations were minimal and not every day. The patient sister requested that he starts Abilify Maintena, however, this  hospital is not able to administer the medication since its none formulary. The patient's sister is aware and will request his outpatient provider to initiate it. The patient and his sister are aware that the oral Abilify will need to be taken for 2 weeks after the first injection.   The patient tolerated medications well. The patient was semi active on the unit. Attended some groups . Was visible in the kvng but largely kept it to himself.  Appetite was intact. The patient was compliant with medications and care. Future oriented. Declined a referral to the 55 plus program.     Rajiv Rdoriguez was released to home. At the time of this encounter, Rajiv Rodriguez was determined to not be a danger to himself or others and symptoms did not meet criteria for involuntary hospitalization.      Safety plan, post discharge recommendations and relapse prevention were discussed with the patient. The patient agreed to call 911 or present to ED if symptoms worsen or developed thoughts of suicide, self harm or homicide.  The patient agreed to continue medications and outpatient care.         Discharge Medications:     Current Discharge Medication List      START taking these medications    Details   diphenhydrAMINE (BENADRYL) 50 MG capsule Take 1 capsule (50 mg) by mouth At Bedtime  Qty: 30 capsule, Refills: 0    Associated Diagnoses: Psychosis, unspecified psychosis type (H)      gabapentin (NEURONTIN) 100 MG capsule Take 1-3 capsules (100-300 mg) by mouth 3 times daily as needed (anxiety)  Qty: 60 capsule, Refills: 0    Associated Diagnoses: Psychosis, unspecified psychosis type (H)      !! OLANZapine (ZYPREXA) 10 MG tablet Take 1 tablet (10 mg) by mouth At Bedtime  Qty: 30 tablet, Refills: 0    Associated Diagnoses: Psychosis, unspecified psychosis type (H)      !! OLANZapine (ZYPREXA) 5 MG tablet Take 1 tablet (5 mg) by mouth every morning  Qty: 30 tablet, Refills: 0    Associated Diagnoses: Psychosis, unspecified psychosis type  (H)       !! - Potential duplicate medications found. Please discuss with provider.      CONTINUE these medications which have CHANGED    Details   ARIPiprazole (ABILIFY) 20 MG tablet Take 1 tablet (20 mg) by mouth every morning  Qty: 30 tablet, Refills: 0    Associated Diagnoses: Psychosis, unspecified psychosis type (H)      aspirin (ASA) 81 MG EC tablet Take 1 tablet (81 mg) by mouth daily  Qty: 30 tablet, Refills: 0    Associated Diagnoses: Mixed hyperlipidemia; History of CVA (cerebrovascular accident); TIA (transient ischemic attack)      atorvastatin (LIPITOR) 40 MG tablet Take 1 tablet (40 mg) by mouth daily  Qty: 30 tablet, Refills: 0    Associated Diagnoses: Combined hyperlipidemia      clopidogrel (PLAVIX) 75 MG tablet Take 1 tablet (75 mg) by mouth daily  Qty: 30 tablet, Refills: 0    Associated Diagnoses: History of CVA (cerebrovascular accident); TIA (transient ischemic attack)      omeprazole (PRILOSEC) 20 MG DR capsule Take 1 capsule (20 mg) by mouth every morning (before breakfast)  Qty: 30 capsule, Refills: 0    Associated Diagnoses: Gastroesophageal reflux disease with esophagitis, unspecified whether hemorrhage         STOP taking these medications       haloperidol (HALDOL) 5 MG tablet Comments:   Reason for Stopping:         omeprazole 20 MG tablet Comments:   Reason for Stopping:                    Psychiatric and Physical Examinations:   Appearance:  well groomed, awake, alert, cooperative and no apparent distress  Attitude:  cooperative  Eye Contact:  good  Mood:  anxious and better  Affect:  mood congruent  Speech:  clear, coherent  Psychomotor Behavior:  no evidence of tardive dyskinesia, dystonia, or tics  Thought Process:  logical and goal oriented  Associations:  no loose associations  Thought Content:  no evidence of suicidal ideation or homicidal ideation  Insight:  good  Judgment:  intact  Oriented to:  time, person, and place  Attention Span and Concentration:  intact  Recent and  Remote Memory:  intact  Language and Fund of Knowledge: low-normal  Muscle Strength and Tone: normal  Gait and Station: Normal  Vitals:    04/17/21 1717 04/18/21 0843 04/18/21 1623 04/19/21 0816   BP: 127/85 121/75 122/84    BP Location:       Pulse: 82 62 75    Resp:  16 16    Temp: 97.5  F (36.4  C) 97.5  F (36.4  C) 97  F (36.1  C) 97.3  F (36.3  C)   TempSrc:  Temporal Temporal Temporal   SpO2: 94% 97%  96%   Weight:  79.7 kg (175 lb 11.2 oz)     Height:                Discharge Plan:     Follow up Appointment:    Psychiatry appointment: Thursday, April 22 at 10:30 am in clinic  Provider:Jody Schoenecker, NP   [facility]  [address]  [place] [address]  [phone]    Attestation:  The patient has been seen and evaluated by me,  Donaldo ORNELAS, CNP

## 2021-09-25 ENCOUNTER — HEALTH MAINTENANCE LETTER (OUTPATIENT)
Age: 66
End: 2021-09-25

## 2022-05-01 ENCOUNTER — HEALTH MAINTENANCE LETTER (OUTPATIENT)
Age: 67
End: 2022-05-01

## 2022-06-10 ENCOUNTER — APPOINTMENT (OUTPATIENT)
Dept: MRI IMAGING | Facility: CLINIC | Age: 67
End: 2022-06-10
Attending: FAMILY MEDICINE

## 2022-06-10 ENCOUNTER — HOSPITAL ENCOUNTER (OUTPATIENT)
Facility: CLINIC | Age: 67
Setting detail: OBSERVATION
Discharge: HOME | End: 2022-06-11
Attending: FAMILY MEDICINE | Admitting: INTERNAL MEDICINE

## 2022-06-10 DIAGNOSIS — F41.9 ANXIETY: ICD-10-CM

## 2022-06-10 DIAGNOSIS — Z11.52 ENCOUNTER FOR SCREENING LABORATORY TESTING FOR SEVERE ACUTE RESPIRATORY SYNDROME CORONAVIRUS 2 (SARS-COV-2): ICD-10-CM

## 2022-06-10 DIAGNOSIS — F20.0 PARANOID SCHIZOPHRENIA (H): Chronic | ICD-10-CM

## 2022-06-10 DIAGNOSIS — E78.2 COMBINED HYPERLIPIDEMIA: ICD-10-CM

## 2022-06-10 DIAGNOSIS — Z86.73 HISTORY OF CVA (CEREBROVASCULAR ACCIDENT): Primary | Chronic | ICD-10-CM

## 2022-06-10 DIAGNOSIS — I63.9 CEREBROVASCULAR ACCIDENT (CVA), UNSPECIFIED MECHANISM (H): ICD-10-CM

## 2022-06-10 DIAGNOSIS — Q21.12 PFO (PATENT FORAMEN OVALE): ICD-10-CM

## 2022-06-10 LAB
ANION GAP SERPL CALCULATED.3IONS-SCNC: 5 MMOL/L (ref 3–14)
APTT PPP: 30 SECONDS (ref 22–38)
BASOPHILS # BLD AUTO: 0.1 10E3/UL (ref 0–0.2)
BASOPHILS NFR BLD AUTO: 1 %
BUN SERPL-MCNC: 30 MG/DL (ref 7–30)
CALCIUM SERPL-MCNC: 9.2 MG/DL (ref 8.5–10.1)
CHLORIDE BLD-SCNC: 112 MMOL/L (ref 94–109)
CO2 SERPL-SCNC: 27 MMOL/L (ref 20–32)
CREAT SERPL-MCNC: 1.18 MG/DL (ref 0.66–1.25)
EOSINOPHIL # BLD AUTO: 0.2 10E3/UL (ref 0–0.7)
EOSINOPHIL NFR BLD AUTO: 3 %
ERYTHROCYTE [DISTWIDTH] IN BLOOD BY AUTOMATED COUNT: 13.9 % (ref 10–15)
GFR SERPL CREATININE-BSD FRML MDRD: 67 ML/MIN/1.73M2
GLUCOSE BLD-MCNC: 107 MG/DL (ref 70–99)
HCT VFR BLD AUTO: 45.1 % (ref 40–53)
HGB BLD-MCNC: 14.7 G/DL (ref 13.3–17.7)
HOLD SPECIMEN: NORMAL
IMM GRANULOCYTES # BLD: 0 10E3/UL
IMM GRANULOCYTES NFR BLD: 0 %
INR PPP: 1.11 (ref 0.85–1.15)
LYMPHOCYTES # BLD AUTO: 1.4 10E3/UL (ref 0.8–5.3)
LYMPHOCYTES NFR BLD AUTO: 20 %
MCH RBC QN AUTO: 26.8 PG (ref 26.5–33)
MCHC RBC AUTO-ENTMCNC: 32.6 G/DL (ref 31.5–36.5)
MCV RBC AUTO: 82 FL (ref 78–100)
MONOCYTES # BLD AUTO: 0.5 10E3/UL (ref 0–1.3)
MONOCYTES NFR BLD AUTO: 8 %
NEUTROPHILS # BLD AUTO: 4.5 10E3/UL (ref 1.6–8.3)
NEUTROPHILS NFR BLD AUTO: 68 %
NRBC # BLD AUTO: 0 10E3/UL
NRBC BLD AUTO-RTO: 0 /100
PA ADP BLD-ACNC: 193 PRU
PLATELET # BLD AUTO: 167 10E3/UL (ref 150–450)
POTASSIUM BLD-SCNC: 3.9 MMOL/L (ref 3.4–5.3)
RBC # BLD AUTO: 5.49 10E6/UL (ref 4.4–5.9)
SARS-COV-2 RNA RESP QL NAA+PROBE: NEGATIVE
SODIUM SERPL-SCNC: 144 MMOL/L (ref 133–144)
TROPONIN I SERPL HS-MCNC: 8 NG/L
WBC # BLD AUTO: 6.7 10E3/UL (ref 4–11)

## 2022-06-10 PROCEDURE — 85730 THROMBOPLASTIN TIME PARTIAL: CPT | Performed by: FAMILY MEDICINE

## 2022-06-10 PROCEDURE — 85025 COMPLETE CBC W/AUTO DIFF WBC: CPT | Performed by: FAMILY MEDICINE

## 2022-06-10 PROCEDURE — C9803 HOPD COVID-19 SPEC COLLECT: HCPCS | Performed by: FAMILY MEDICINE

## 2022-06-10 PROCEDURE — 99285 EMERGENCY DEPT VISIT HI MDM: CPT | Mod: 25 | Performed by: FAMILY MEDICINE

## 2022-06-10 PROCEDURE — 87635 SARS-COV-2 COVID-19 AMP PRB: CPT | Performed by: FAMILY MEDICINE

## 2022-06-10 PROCEDURE — 85610 PROTHROMBIN TIME: CPT | Performed by: FAMILY MEDICINE

## 2022-06-10 PROCEDURE — 70547 MR ANGIOGRAPHY NECK W/O DYE: CPT

## 2022-06-10 PROCEDURE — G0378 HOSPITAL OBSERVATION PER HR: HCPCS

## 2022-06-10 PROCEDURE — 36415 COLL VENOUS BLD VENIPUNCTURE: CPT | Performed by: STUDENT IN AN ORGANIZED HEALTH CARE EDUCATION/TRAINING PROGRAM

## 2022-06-10 PROCEDURE — 93010 ELECTROCARDIOGRAM REPORT: CPT | Performed by: FAMILY MEDICINE

## 2022-06-10 PROCEDURE — 36415 COLL VENOUS BLD VENIPUNCTURE: CPT | Performed by: FAMILY MEDICINE

## 2022-06-10 PROCEDURE — 250N000013 HC RX MED GY IP 250 OP 250 PS 637: Performed by: FAMILY MEDICINE

## 2022-06-10 PROCEDURE — 93005 ELECTROCARDIOGRAM TRACING: CPT | Performed by: FAMILY MEDICINE

## 2022-06-10 PROCEDURE — 85576 BLOOD PLATELET AGGREGATION: CPT

## 2022-06-10 PROCEDURE — 80048 BASIC METABOLIC PNL TOTAL CA: CPT | Performed by: FAMILY MEDICINE

## 2022-06-10 PROCEDURE — A9585 GADOBUTROL INJECTION: HCPCS | Performed by: FAMILY MEDICINE

## 2022-06-10 PROCEDURE — 70551 MRI BRAIN STEM W/O DYE: CPT

## 2022-06-10 PROCEDURE — 83036 HEMOGLOBIN GLYCOSYLATED A1C: CPT | Performed by: PHYSICIAN ASSISTANT

## 2022-06-10 PROCEDURE — 84484 ASSAY OF TROPONIN QUANT: CPT | Performed by: FAMILY MEDICINE

## 2022-06-10 PROCEDURE — 70544 MR ANGIOGRAPHY HEAD W/O DYE: CPT

## 2022-06-10 PROCEDURE — 255N000002 HC RX 255 OP 636: Performed by: FAMILY MEDICINE

## 2022-06-10 RX ORDER — ARIPIPRAZOLE 20 MG/1
20 TABLET ORAL EVERY MORNING
Status: DISCONTINUED | OUTPATIENT
Start: 2022-06-11 | End: 2022-06-10 | Stop reason: CLARIF

## 2022-06-10 RX ORDER — GABAPENTIN 100 MG/1
100-300 CAPSULE ORAL 3 TIMES DAILY PRN
Status: DISCONTINUED | OUTPATIENT
Start: 2022-06-10 | End: 2022-06-10 | Stop reason: DRUGHIGH

## 2022-06-10 RX ORDER — ATORVASTATIN CALCIUM 20 MG/1
40 TABLET, FILM COATED ORAL DAILY
Status: DISCONTINUED | OUTPATIENT
Start: 2022-06-11 | End: 2022-06-11 | Stop reason: HOSPADM

## 2022-06-10 RX ORDER — OLANZAPINE 5 MG/1
5 TABLET ORAL EVERY MORNING
Status: DISCONTINUED | OUTPATIENT
Start: 2022-06-11 | End: 2022-06-11 | Stop reason: HOSPADM

## 2022-06-10 RX ORDER — ARIPIPRAZOLE 10 MG/1
20 TABLET ORAL EVERY MORNING
Status: DISCONTINUED | OUTPATIENT
Start: 2022-06-11 | End: 2022-06-11 | Stop reason: HOSPADM

## 2022-06-10 RX ORDER — OLANZAPINE 5 MG/1
10 TABLET ORAL AT BEDTIME
Status: DISCONTINUED | OUTPATIENT
Start: 2022-06-10 | End: 2022-06-11 | Stop reason: HOSPADM

## 2022-06-10 RX ORDER — GABAPENTIN 300 MG/1
300 CAPSULE ORAL ONCE
Status: COMPLETED | OUTPATIENT
Start: 2022-06-10 | End: 2022-06-10

## 2022-06-10 RX ORDER — GADOBUTROL 604.72 MG/ML
10 INJECTION INTRAVENOUS ONCE
Status: DISCONTINUED | OUTPATIENT
Start: 2022-06-10 | End: 2022-06-11 | Stop reason: HOSPADM

## 2022-06-10 RX ORDER — ATORVASTATIN CALCIUM 40 MG/1
40 TABLET, FILM COATED ORAL DAILY
Status: DISCONTINUED | OUTPATIENT
Start: 2022-06-11 | End: 2022-06-10

## 2022-06-10 RX ADMIN — GABAPENTIN 300 MG: 300 CAPSULE ORAL at 21:27

## 2022-06-10 RX ADMIN — OLANZAPINE 10 MG: 10 TABLET, FILM COATED ORAL at 21:24

## 2022-06-10 NOTE — ED PROVIDER NOTES
"  HPI   The patient is a 67-year-old male presenting with concern for stroke.  He has had a stroke previously as well as a TIA.  He has a known history of schizophrenia.  He takes aspirin 81 mg and Plavix daily.  He denies recent medication change.    Yesterday morning the patient awoke with left arm incoordination and weakness.  This lasted about 2 hours.  The rest of the day he was without arm symptoms.  He denies arm pain.  No obvious trauma or injury.  No headache or neck pain.  Then, this morning he woke up feeling normally but at about 10:00 AM he had onset of left leg incoordination and weakness.  He tells me that he could walk but it was difficult and \"it just was not working right.\"  Symptoms lasted approximately 2 to 3 hours this time.  He is without weakness or incoordination currently.  He also described having some paresthesia of the left leg.  This is also gone.  No difficulty with speech.  No visual changes.  No obvious facial droop.  No recent trauma or injury involving the lower extremity.  No recent head trauma.  No fever or obvious infection.  No skin rash.  He is feeling well currently but he is concerned.        Allergies:  No Known Allergies  Problem List:    Patient Active Problem List    Diagnosis Date Noted     History of CVA (cerebrovascular accident) 07/13/2020     Priority: High     Presented 11/23/12, symptoms of sudden unsteadiness and hoarseness of voice. 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 No evidence of large vessel disease on MRI/MRA, which looks consistent with embolic stroke. No carotid disease. FRIEDA with no cardiac source of thrombus. Placed on cardiac telemetry for >48 hours. Started on full dose asa 325 mg daily        Anxiety 06/11/2022     Priority: Medium     PFO (patent foramen ovale) 06/10/2022     Priority: Medium     Cerebrovascular accident (CVA), unspecified " "mechanism (H) 06/10/2022     Priority: Medium     Psychosis, unspecified psychosis type (H) 04/11/2021     Priority: Medium     TIA (transient ischemic attack) 07/13/2020     Priority: Medium     Benign prostatic hyperplasia with lower urinary tract symptoms 12/11/2015     Priority: Medium     GERD (gastroesophageal reflux disease) 09/16/2013     Priority: Medium     Schizophrenia (H) 02/25/2013     Priority: Medium     Mixed hyperlipidemia 01/06/2011     Priority: Medium      Past Medical History:    Past Medical History:   Diagnosis Date     Acute exacerbation of chronic paranoid schizophrenia (H) 3/1/2016     Esophageal stricture 08/28/2018     Stroke (H) 11/23/2012     Past Surgical History:    Past Surgical History:   Procedure Laterality Date     DILATE ESOPHAGUS  08/2017    ESOPHAGEAL DILATION     PROSTATE SURGERY  08/2016    LASER OF PROSTATE W/ GREEN LIGHT PVP     Family History:    Family History   Problem Relation Age of Onset     Alzheimer Disease Mother      Cancer Father         skin     Social History:  Marital Status:  Single [1]  Social History     Tobacco Use     Smoking status: Never Smoker     Smokeless tobacco: Never Used   Substance Use Topics     Alcohol use: No     Drug use: No      Medications:    ARIPiprazole (ABILIFY) 20 MG tablet  aspirin (ASA) 81 MG EC tablet  atorvastatin (LIPITOR) 80 MG tablet  hydrOXYzine (VISTARIL) 25 MG capsule  ticagrelor (BRILINTA) 90 MG tablet  OLANZapine (ZYPREXA) 10 MG tablet  OLANZapine (ZYPREXA) 5 MG tablet      Review of Systems   All other systems reviewed and are negative.      PE   BP: 135/81  Pulse: 69  Temp: 98  F (36.7  C)  Resp: 14  Height: 177.8 cm (5' 10\")  Weight: 86.2 kg (190 lb)  SpO2: 98 %  Physical Exam  Vitals and nursing note reviewed.   Constitutional:       General: He is not in acute distress.  HENT:      Head: Atraumatic.      Right Ear: External ear normal.      Left Ear: External ear normal.      Nose: Nose normal.      Mouth/Throat:    " "  Mouth: Mucous membranes are moist.      Pharynx: Oropharynx is clear.   Eyes:      General: No scleral icterus.     Extraocular Movements: Extraocular movements intact.      Conjunctiva/sclera: Conjunctivae normal.      Pupils: Pupils are equal, round, and reactive to light.   Cardiovascular:      Rate and Rhythm: Normal rate.   Pulmonary:      Effort: Pulmonary effort is normal. No respiratory distress.   Musculoskeletal:         General: Normal range of motion.      Cervical back: Normal range of motion.   Skin:     General: Skin is warm and dry.   Neurological:      General: No focal deficit present.      Mental Status: He is alert and oriented to person, place, and time.      Comments: No dysarthria or dysphasia.  CN II-VIII intact grossly.  Moving U/L extremities B.  Strength 5/5 U/L extremities B.  Sensation intact grossly to touch (equal).  Rapid alternating movements intact.  Negative Rhomberg.  Normal finger-to-nose movments.     Psychiatric:         Behavior: Behavior normal.         ED COURSE and [facility]   1545.  The patient has no symptoms currently but presents with concern for stroke.  Known history of stroke and TIA.  On aspirin 81 mg and Plavix daily.  Yesterday he had left arm weakness and incoordination that was transient.  Today he has had left leg weakness and incoordination that was transient, lasting 2 to 3 hours.  MRI ordered.  Lab values pending.    1757.  Neurological examination does reveal left arm incoordination that is persistent.  He has some subtle difficulty performing finger-to-nose testing compared to the right.  The patient says, \"Wow, I did not even know that this was here!\"  Symptoms involving the left arm started yesterday, as above.  Concern for stroke is high.     1906.  MRI limited by patient cooperation; he refused further imaging and told the tech that he was done prior to contrast being given.  Signed out to Dr. Henderson.       LABS  Labs Ordered and Resulted from Time of ED " Arrival to Time of ED Departure   BASIC METABOLIC PANEL - Abnormal       Result Value    Sodium 144      Potassium 3.9      Chloride 112 (*)     Carbon Dioxide (CO2) 27      Anion Gap 5      Urea Nitrogen 30      Creatinine 1.18      Calcium 9.2      Glucose 107 (*)     GFR Estimate 67     INR - Normal    INR 1.11     PARTIAL THROMBOPLASTIN TIME - Normal    aPTT 30     TROPONIN I - Normal    Troponin I High Sensitivity 8     COVID-19 VIRUS (CORONAVIRUS) BY PCR - Normal    SARS CoV2 PCR Negative     CBC WITH PLATELETS AND DIFFERENTIAL    WBC Count 6.7      RBC Count 5.49      Hemoglobin 14.7      Hematocrit 45.1      MCV 82      MCH 26.8      MCHC 32.6      RDW 13.9      Platelet Count 167      % Neutrophils 68      % Lymphocytes 20      % Monocytes 8      % Eosinophils 3      % Basophils 1      % Immature Granulocytes 0      NRBCs per 100 WBC 0      Absolute Neutrophils 4.5      Absolute Lymphocytes 1.4      Absolute Monocytes 0.5      Absolute Eosinophils 0.2      Absolute Basophils 0.1      Absolute Immature Granulocytes 0.0      Absolute NRBCs 0.0     PLATELET FUNCTION P2Y12    Platelet Function P2Y12 193         IMAGING  Images reviewed by me.  Radiology report also reviewed.  US Lower Extremity Venous Duplex Bilateral   Final Result   Addendum 1 of 1   History: Cerebrovascular accident      SERAFIN KATZ MD            SYSTEM ID:  [id number]      Final   IMPRESSION:   1.  No deep venous thrombosis in the bilateral lower extremities.         MRA Brain ([place] [facility] [place]) wo Contrast   Final Result   IMPRESSION:      HEAD MRI:   1. Findings suspicious for acute ischemia involving the right precentral gyrus.       HEAD MRA:    1. Unremarkable intracranial vasculature.      NECK MRA:   1. Unremarkable neck vasculature.      - - - - - - - - - - - - - - - - - - - - - - - - - - - - - - - - - - - - - - - - - - - - - - - - - - - - - - - - - - - - - - - - - - - - - - - - - - - -    The results above were discussed with  Dr. Brewer on 6/10/2022 7:11 PM by Dr. Nathaniel Hansen.   - - - - - - - - - - - - - - - - - - - - - - - - - - - - - - - - - - - - - - - - - - - - - - - - - - - - - - - - - - - - - - - - - - - - - - - - - - - -      MR Brain w/o Contrast   Final Result   IMPRESSION:      HEAD MRI:   1. Findings suspicious for acute ischemia involving the right precentral gyrus.       HEAD MRA:    1. Unremarkable intracranial vasculature.      NECK MRA:   1. Unremarkable neck vasculature.      - - - - - - - - - - - - - - - - - - - - - - - - - - - - - - - - - - - - - - - - - - - - - - - - - - - - - - - - - - - - - - - - - - - - - - - - - - - -    The results above were discussed with Dr. Brewer on 6/10/2022 7:11 PM by Dr. Nathaniel Hansen.   - - - - - - - - - - - - - - - - - - - - - - - - - - - - - - - - - - - - - - - - - - - - - - - - - - - - - - - - - - - - - - - - - - - - - - - - - - - -      MRA Angiogram Neck w/o Contrast   Final Result   IMPRESSION:      HEAD MRI:   1. Findings suspicious for acute ischemia involving the right precentral gyrus.       HEAD MRA:    1. Unremarkable intracranial vasculature.      NECK MRA:   1. Unremarkable neck vasculature.      - - - - - - - - - - - - - - - - - - - - - - - - - - - - - - - - - - - - - - - - - - - - - - - - - - - - - - - - - - - - - - - - - - - - - - - - - - - -    The results above were discussed with Dr. Brewer on 6/10/2022 7:11 PM by Dr. Nathaniel Hansen.   - - - - - - - - - - - - - - - - - - - - - - - - - - - - - - - - - - - - - - - - - - - - - - - - - - - - - - - - - - - - - - - - - - - - - - - - - - - -          Procedures    Medications   sodium chloride (PF) 0.9% PF flush 50 mL ( Intracatheter Canceled Entry 6/10/22 2387)   gabapentin (NEURONTIN) capsule 300 mg (300 mg Oral Given 6/10/22 2127)   ticagrelor (BRILINTA) tablet 180 mg (180 mg Oral Given 6/11/22 1356)         IMPRESSION       ICD-10-CM    1. History of CVA (cerebrovascular accident)  Z86.73 aspirin (ASA) 81  MG EC tablet     DISCONTINUED: aspirin (ASA) 81 MG EC tablet   2. Cerebrovascular accident (CVA), unspecified mechanism (H)  I63.9 ticagrelor (BRILINTA) 90 MG tablet     atorvastatin (LIPITOR) 80 MG tablet     DISCONTINUED: ticagrelor (BRILINTA) 90 MG tablet     DISCONTINUED: atorvastatin (LIPITOR) 80 MG tablet   3. PFO (patent foramen ovale)  Q21.1 ticagrelor (BRILINTA) 90 MG tablet     aspirin (ASA) 81 MG EC tablet     DISCONTINUED: ticagrelor (BRILINTA) 90 MG tablet     DISCONTINUED: aspirin (ASA) 81 MG EC tablet   4. Paranoid schizophrenia (H)  F20.0    5. Combined hyperlipidemia  E78.2    6. Anxiety  F41.9 hydrOXYzine (VISTARIL) 25 MG capsule     DISCONTINUED: hydrOXYzine (VISTARIL) 25 MG capsule            Medication List      Started    aspirin 81 MG EC tablet  Commonly known as: ASA  81 mg, Oral, DAILY     hydrOXYzine 25 MG capsule  Commonly known as: VISTARIL  25-50 mg, Oral, 3 TIMES DAILY PRN     ticagrelor 90 MG tablet  Commonly known as: BRILINTA  90 mg, Oral, 2 TIMES DAILY        Modified    atorvastatin 80 MG tablet  Commonly known as: LIPITOR  80 mg, Oral, DAILY  What changed:     medication strength    how much to take        Discontinued    clopidogrel 75 MG tablet  Commonly known as: PLAVIX     gabapentin 100 MG capsule  Commonly known as: Miguel Flores MD  06/18/22 1914

## 2022-06-10 NOTE — ED TRIAGE NOTES
Left arm weakness and numbness yesterday and intermittently today. Resolved now. Takes aspirin 81mg daily, and plavix. Hx of stroke in 2012 Stroke eval called upon arrival MD Brewer at bedside.      Triage Assessment     Row Name 06/10/22 1537       Triage Assessment (Adult)    Airway WDL WDL       Respiratory WDL    Respiratory WDL WDL       Skin Circulation/Temperature WDL    Skin Circulation/Temperature WDL WDL       Cardiac WDL    Cardiac WDL WDL       Peripheral/Neurovascular WDL    Peripheral Neurovascular WDL WDL       Cognitive/Neuro/Behavioral WDL    Cognitive/Neuro/Behavioral WDL WDL

## 2022-06-11 ENCOUNTER — APPOINTMENT (OUTPATIENT)
Dept: ULTRASOUND IMAGING | Facility: CLINIC | Age: 67
End: 2022-06-11
Attending: PHYSICIAN ASSISTANT

## 2022-06-11 VITALS
RESPIRATION RATE: 17 BRPM | OXYGEN SATURATION: 96 % | HEIGHT: 70 IN | BODY MASS INDEX: 26.04 KG/M2 | TEMPERATURE: 98 F | DIASTOLIC BLOOD PRESSURE: 67 MMHG | HEART RATE: 58 BPM | SYSTOLIC BLOOD PRESSURE: 112 MMHG | WEIGHT: 181.88 LBS

## 2022-06-11 PROBLEM — F41.9 ANXIETY: Status: ACTIVE | Noted: 2022-06-11

## 2022-06-11 LAB
ANION GAP SERPL CALCULATED.3IONS-SCNC: 7 MMOL/L (ref 3–14)
BUN SERPL-MCNC: 27 MG/DL (ref 7–30)
CALCIUM SERPL-MCNC: 8.8 MG/DL (ref 8.5–10.1)
CHLORIDE BLD-SCNC: 114 MMOL/L (ref 94–109)
CHOLEST SERPL-MCNC: 170 MG/DL
CO2 SERPL-SCNC: 23 MMOL/L (ref 20–32)
CREAT SERPL-MCNC: 0.96 MG/DL (ref 0.66–1.25)
ERYTHROCYTE [DISTWIDTH] IN BLOOD BY AUTOMATED COUNT: 13.9 % (ref 10–15)
FASTING STATUS PATIENT QL REPORTED: ABNORMAL
GFR SERPL CREATININE-BSD FRML MDRD: 86 ML/MIN/1.73M2
GLUCOSE BLD-MCNC: 99 MG/DL (ref 70–99)
GLUCOSE BLDC GLUCOMTR-MCNC: 103 MG/DL (ref 70–99)
HBA1C MFR BLD: 5.8 % (ref 0–5.6)
HCT VFR BLD AUTO: 43.8 % (ref 40–53)
HDLC SERPL-MCNC: 41 MG/DL
HGB BLD-MCNC: 14.3 G/DL (ref 13.3–17.7)
LDLC SERPL CALC-MCNC: 97 MG/DL
MCH RBC QN AUTO: 26.6 PG (ref 26.5–33)
MCHC RBC AUTO-ENTMCNC: 32.6 G/DL (ref 31.5–36.5)
MCV RBC AUTO: 81 FL (ref 78–100)
NONHDLC SERPL-MCNC: 129 MG/DL
PLATELET # BLD AUTO: 131 10E3/UL (ref 150–450)
POTASSIUM BLD-SCNC: 3.9 MMOL/L (ref 3.4–5.3)
RBC # BLD AUTO: 5.38 10E6/UL (ref 4.4–5.9)
SODIUM SERPL-SCNC: 144 MMOL/L (ref 133–144)
TRIGL SERPL-MCNC: 160 MG/DL
WBC # BLD AUTO: 5.9 10E3/UL (ref 4–11)

## 2022-06-11 PROCEDURE — G0378 HOSPITAL OBSERVATION PER HR: HCPCS

## 2022-06-11 PROCEDURE — 85027 COMPLETE CBC AUTOMATED: CPT | Performed by: PHYSICIAN ASSISTANT

## 2022-06-11 PROCEDURE — 80048 BASIC METABOLIC PNL TOTAL CA: CPT | Performed by: PHYSICIAN ASSISTANT

## 2022-06-11 PROCEDURE — 99217 PR OBSERVATION CARE DISCHARGE: CPT | Performed by: FAMILY MEDICINE

## 2022-06-11 PROCEDURE — 82962 GLUCOSE BLOOD TEST: CPT

## 2022-06-11 PROCEDURE — 250N000013 HC RX MED GY IP 250 OP 250 PS 637: Performed by: PHYSICIAN ASSISTANT

## 2022-06-11 PROCEDURE — 999N000147 HC STATISTIC PT IP EVAL DEFER: Performed by: PHYSICAL THERAPIST

## 2022-06-11 PROCEDURE — 80061 LIPID PANEL: CPT | Performed by: PHYSICIAN ASSISTANT

## 2022-06-11 PROCEDURE — 36415 COLL VENOUS BLD VENIPUNCTURE: CPT | Performed by: PHYSICIAN ASSISTANT

## 2022-06-11 PROCEDURE — 93970 EXTREMITY STUDY: CPT

## 2022-06-11 RX ORDER — NICOTINE 21 MG/24HR
1 PATCH, TRANSDERMAL 24 HOURS TRANSDERMAL DAILY
Status: DISCONTINUED | OUTPATIENT
Start: 2022-06-11 | End: 2022-06-11 | Stop reason: HOSPADM

## 2022-06-11 RX ORDER — ONDANSETRON 4 MG/1
4 TABLET, ORALLY DISINTEGRATING ORAL EVERY 6 HOURS PRN
Status: DISCONTINUED | OUTPATIENT
Start: 2022-06-11 | End: 2022-06-11 | Stop reason: HOSPADM

## 2022-06-11 RX ORDER — LIDOCAINE 40 MG/G
CREAM TOPICAL
Status: DISCONTINUED | OUTPATIENT
Start: 2022-06-11 | End: 2022-06-11 | Stop reason: HOSPADM

## 2022-06-11 RX ORDER — ATORVASTATIN CALCIUM 80 MG/1
80 TABLET, FILM COATED ORAL DAILY
Qty: 30 TABLET | Refills: 0 | Status: SHIPPED | OUTPATIENT
Start: 2022-06-11 | End: 2022-06-11

## 2022-06-11 RX ORDER — ONDANSETRON 2 MG/ML
4 INJECTION INTRAMUSCULAR; INTRAVENOUS EVERY 6 HOURS PRN
Status: DISCONTINUED | OUTPATIENT
Start: 2022-06-11 | End: 2022-06-11 | Stop reason: HOSPADM

## 2022-06-11 RX ORDER — HYDROXYZINE PAMOATE 25 MG/1
25-50 CAPSULE ORAL 3 TIMES DAILY PRN
Qty: 60 CAPSULE | Refills: 0 | Status: SHIPPED | OUTPATIENT
Start: 2022-06-11

## 2022-06-11 RX ORDER — HYDROXYZINE PAMOATE 25 MG/1
25-50 CAPSULE ORAL 3 TIMES DAILY PRN
Qty: 60 CAPSULE | Refills: 0 | Status: SHIPPED | OUTPATIENT
Start: 2022-06-11 | End: 2022-06-11

## 2022-06-11 RX ORDER — ATORVASTATIN CALCIUM 80 MG/1
80 TABLET, FILM COATED ORAL DAILY
Qty: 30 TABLET | Refills: 0 | Status: SHIPPED | OUTPATIENT
Start: 2022-06-11

## 2022-06-11 RX ORDER — PROCHLORPERAZINE MALEATE 5 MG
5 TABLET ORAL EVERY 6 HOURS PRN
Status: DISCONTINUED | OUTPATIENT
Start: 2022-06-11 | End: 2022-06-11 | Stop reason: HOSPADM

## 2022-06-11 RX ORDER — PROCHLORPERAZINE 25 MG
12.5 SUPPOSITORY, RECTAL RECTAL EVERY 12 HOURS PRN
Status: DISCONTINUED | OUTPATIENT
Start: 2022-06-11 | End: 2022-06-11 | Stop reason: HOSPADM

## 2022-06-11 RX ADMIN — TICAGRELOR 180 MG: 90 TABLET ORAL at 03:48

## 2022-06-11 RX ADMIN — ARIPIPRAZOLE 20 MG: 10 TABLET ORAL at 08:20

## 2022-06-11 RX ADMIN — OLANZAPINE 5 MG: 5 TABLET, FILM COATED ORAL at 08:20

## 2022-06-11 RX ADMIN — ATORVASTATIN CALCIUM 40 MG: 20 TABLET, FILM COATED ORAL at 08:19

## 2022-06-11 NOTE — ED PROVIDER NOTES
"     Emergency Department Patient Sign-out       Brief HPI:  This is a 67 year old male signed out to me by Dr. Brewer .  See initial ED Provider note for details of the presentation.     last CVA 2013.    history of schizophrenia prior CVA  new onset left arm  incoordination, weakness yesterday x2-3 hours  - in the am. resolved  today recurred - awoke normal. 10 am one hour of left leg inocoordination.   no other symptoms.  now asymptomatic  mild inccoordination finger nose -finger left side    MRI today shows precentral gyrus ischemic infarct.  clean vessels on MRA  aspirin 81. plavix.      no swallowing difficulty    Significant Events prior to my assuming care:            Exam:   Patient Vitals for the past 24 hrs:   BP Temp Temp src Pulse Resp SpO2 Height Weight   06/10/22 1615 -- -- -- 62 14 97 % -- --   06/10/22 1600 -- -- -- 72 (!) 36 96 % -- --   06/10/22 1545 -- -- -- 66 (!) 35 96 % -- --   06/10/22 1537 135/81 98  F (36.7  C) Oral 69 14 98 % 1.778 m (5' 10\") 86.2 kg (190 lb)           ED RESULTS:   Results for orders placed or performed during the hospital encounter of 06/10/22 (from the past 24 hour(s))   [place] Draw     Status: None    Collection Time: 06/10/22  3:40 PM    Narrative    The following orders were created for panel order [place] Draw.  Procedure                               Abnormality         Status                     ---------                               -----------         ------                     Extra Blue Top Tube[[id number]]                              Final result               Extra Red Top Tube[[id number]]                               Final result               Extra Green Top (Lithium...[[id number]]                      Final result               Extra Purple Top Tube[[id number]]                            Final result                 Please view results for these tests on the individual orders.   Extra Blue Top Tube     Status: None    Collection Time: 06/10/22  3:40 PM "   Result Value Ref Range    Hold Specimen JIC    Extra Red Top Tube     Status: None    Collection Time: 06/10/22  3:40 PM   Result Value Ref Range    Hold Specimen JIC    Extra Green Top (Lithium Heparin) Tube     Status: None    Collection Time: 06/10/22  3:40 PM   Result Value Ref Range    Hold Specimen JIC    Extra Purple Top Tube     Status: None    Collection Time: 06/10/22  3:40 PM   Result Value Ref Range    Hold Specimen JIC    CBC with Platelets & Differential     Status: None    Collection Time: 06/10/22  3:40 PM    Narrative    The following orders were created for panel order CBC with Platelets & Differential.  Procedure                               Abnormality         Status                     ---------                               -----------         ------                     CBC with platelets and d...[[id number]]                      Final result                 Please view results for these tests on the individual orders.   Basic metabolic panel     Status: Abnormal    Collection Time: 06/10/22  3:40 PM   Result Value Ref Range    Sodium 144 133 - 144 mmol/L    Potassium 3.9 3.4 - 5.3 mmol/L    Chloride 112 (H) 94 - 109 mmol/L    Carbon Dioxide (CO2) 27 20 - 32 mmol/L    Anion Gap 5 3 - 14 mmol/L    Urea Nitrogen 30 7 - 30 mg/dL    Creatinine 1.18 0.66 - 1.25 mg/dL    Calcium 9.2 8.5 - 10.1 mg/dL    Glucose 107 (H) 70 - 99 mg/dL    GFR Estimate 67 >60 mL/min/1.73m2   INR     Status: Normal    Collection Time: 06/10/22  3:40 PM   Result Value Ref Range    INR 1.11 0.85 - 1.15   Partial thromboplastin time     Status: Normal    Collection Time: 06/10/22  3:40 PM   Result Value Ref Range    aPTT 30 22 - 38 Seconds   Troponin I     Status: Normal    Collection Time: 06/10/22  3:40 PM   Result Value Ref Range    Troponin I High Sensitivity 8 <79 ng/L   CBC with platelets and differential     Status: None    Collection Time: 06/10/22  3:40 PM   Result Value Ref Range    WBC Count 6.7 4.0 - 11.0  10e3/uL    RBC Count 5.49 4.40 - 5.90 10e6/uL    Hemoglobin 14.7 13.3 - 17.7 g/dL    Hematocrit 45.1 40.0 - 53.0 %    MCV 82 78 - 100 fL    MCH 26.8 26.5 - 33.0 pg    MCHC 32.6 31.5 - 36.5 g/dL    RDW 13.9 10.0 - 15.0 %    Platelet Count 167 150 - 450 10e3/uL    % Neutrophils 68 %    % Lymphocytes 20 %    % Monocytes 8 %    % Eosinophils 3 %    % Basophils 1 %    % Immature Granulocytes 0 %    NRBCs per 100 WBC 0 <1 /100    Absolute Neutrophils 4.5 1.6 - 8.3 10e3/uL    Absolute Lymphocytes 1.4 0.8 - 5.3 10e3/uL    Absolute Monocytes 0.5 0.0 - 1.3 10e3/uL    Absolute Eosinophils 0.2 0.0 - 0.7 10e3/uL    Absolute Basophils 0.1 0.0 - 0.2 10e3/uL    Absolute Immature Granulocytes 0.0 <=0.4 10e3/uL    Absolute NRBCs 0.0 10e3/uL       ED MEDICATIONS:   Medications   gadobutrol (GADAVIST) injection 10 mL ( Intravenous Canceled Entry 6/10/22 1820)   sodium chloride (PF) 0.9% PF flush 50 mL ( Intracatheter Canceled Entry 6/10/22 1857)         Impression:    ICD-10-CM    1. Cerebrovascular accident (CVA), unspecified mechanism (H)  I63.9    2. PFO (patent foramen ovale)  Q21.1    3. Paranoid schizophrenia (H)  F20.0        Plan:    Pending studies include none.    I discussed with Dr Naranjo [facility] stroke neuro.  Recommends that the patient be on Brilinta at 180 load then 90 mg twice daily.  Stop Plavix.  Continue aspirin 81 mg also.  Echocardiogram still recommended despite knowing that he also has a PFO.  Plan for follow-up with cardiology to pursue closure of PFO due to multiple CVAs.  They were in the process of doing this approximately 2 years ago but needed a esophageal dilation to perform the FRIEDA echo and he intermittently lost his insurance during that time.  Discussed with Lillian Higgins.  Accepts for observation..           ED to Inpatient Handoff:    Discussed with Lillian VELOZ  Patient accepted for Observation Stay  Pending studies include none  Code Status: Not Addressed             Dariel ALICEA  MD Santiago Henderson Scott J, MD  06/10/22 2051

## 2022-06-11 NOTE — PROGRESS NOTES
Patient is feeling anxious, his reported symptom is feeling SOA. He would like something to help with his anxiety. VSS. He does not take any prn medications at home for anxiety. Writer asked if he could call his sister or a friend to help with anxiety and he said no. Web page to Dr Beck.

## 2022-06-11 NOTE — PHARMACY
Pharmacy Consult to evaluate for medication related stroke core measures    Rajiv Rodriguez, 67 year old male admitted for CVA on 6/10/2022.    Thrombolytic was not given because of Time from onset contraindications    VTE Prophylaxis SCDs given on 6/11/2022 as appropriate prior to end of hospital day 2.    Antithrombotic: aspirin and ticagrelor started on 6/11/2022, as appropriate by end of hospital day 2. Continue antithrombotic therapy on discharge to meet quality measures, unless contraindicated.    Anticoagulation if history of A-fib/flutter: Patient does not have history of A-fib/flutter - anticoagulation not required for medication related stroke core measures.     LDL Cholesterol Calculated   Date Value Ref Range Status   06/11/2022 97 <=100 mg/dL Final   11/24/2012 134 (H) 0 - 129 mg/dL Final     Comment:     LDL Cholesterol is the primary guide to therapy: LDL-cholesterol goal in high   risk patients is <100 mg/dL and in very high risk patients is <70 mg/dL.       Patient's home statin, Lipitor (atorvastatin) restarted; continue statin on discharge to meet quality measures, unless contraindicated. Dose increased.     Recommendations: None at this time    Thank you for the consult.    Lillian Corona [facility] 6/11/2022 1:30 PM

## 2022-06-11 NOTE — PROGRESS NOTES
"[place] [facility] [place]    Patient admitted to room 2205 at approximately 0140 via cart from emergency room. Patient was accompanied by transport tech.     Verbal SBAR report received from Ml prior to patient arrival.     Patient ambulated to bed independently. Patient alert and oriented X 3. The patient is not having any pain.  . Admission vital signs: Blood pressure 115/63, pulse 59, temperature 97.5  F (36.4  C), temperature source Oral, resp. rate 18, height 1.778 m (5' 10\"), weight 82.5 kg (181 lb 14.1 oz), SpO2 97 %. Patient was oriented to plan of care, call light, bed controls, tv, telephone, bathroom and visiting hours.     Risk Assessment    The following safety risks were identified during admission: none. Yellow risk band applied: NO.     Skin Initial Assessment    This writer admitted this patient and declined a full skin assessment and William score in the Adult PCS flowsheet. Appropriate interventions initiated as needed.            Education    Patient has a [place] to Observation order: Yes  Observation education completed and documented: Yes      YASMEEN BILLY RN    "

## 2022-06-11 NOTE — PLAN OF CARE
[place] [facility] DISCHARGE NOTE    Patient discharged to home at 1300 via wheel chair. Accompanied by other:sister picked up patient. Discharge instructions reviewed with patient, opportunity offered to ask questions. Prescriptions sent to patients preferred pharmacy. All belongings sent with patient.    Rhona Giang RNGoal Outcome Evaluation:

## 2022-06-11 NOTE — PLAN OF CARE
Physical Therapy- met with pt, has been up to bathroom indep, reports no issues with legs or balance, only thing he notices is some incoordination with L arm, is able to demo full ROM, can make fist, can do finger to nose, he declines any OP assessment or follow up for the L UE, wants to just see how it goes at home, declines any IP PT needs at this time

## 2022-06-11 NOTE — PROGRESS NOTES
"Patient is alert and oriented, answers questions appropriately. Q4 neuros completed. Slight incoordination noted in finger-to-nose test, but otherwise appears intact. Telemetry on. PCDs applied.    Blood pressure 115/63, pulse 59, temperature 97.5  F (36.4  C), temperature source Oral, resp. rate 18, height 1.778 m (5' 10\"), weight 82.5 kg (181 lb 14.1 oz), SpO2 97 %.    "

## 2022-06-11 NOTE — PLAN OF CARE
Patient is alert, oriented, steady on feet. No deviance in strength UE/LE, no palmar drift, ARCELIA. Appetite is good, no sx/sx of aspiration. LS clear.

## 2022-06-11 NOTE — CONSULTS
"      [facility]    Stroke Telephone Note    I was called by Dariel Henderson on 06/10/22 regarding patient Rajiv Rodriguez. The patient is a 67 year old male w hx HLD, prior CVA w PFO not closed - on DAPT (reason for regimen unclear), schizophrenia with occasional med noncompliance, comes in with 2 days of L numbness. Started yesterday with LUE weakness and numbness, symptoms fluctuated today and yesterday, currently asymptomatic.    MRI brain shows small R precentral gyrus infarct    Imaging Findings     HEAD MRI:  1. Findings suspicious for acute ischemia involving the right precentral gyrus.      HEAD MRA:   1. Unremarkable intracranial vasculature.     NECK MRA:  1. Unremarkable neck vasculature.    Impression    68yo man with R precentral gyrus infarct suspect cardioembolism 2/2 PFO. Already on Plavix, statin, ASA, compliant per ED. Will switch to Brillinta and check Plavix level. Admit for workup.    Recommendations   - Use orderset: \"Ischemic Stroke Routine Admission\" or \"Ischemic Stroke No Thrombolytics/No Thrombectomy ICU Admission\"  - Neurochecks and Vital Signs every q4h   - Permissive HTN; goal SBP < 180 mmHg  - Continue home Aspirin  - Stop Plavix, ordered Plavix assay  - Brillinta loading dose 180mg, start on 90mg bid  - Statin: Atorvastatin 40mg qhs  - MRI Brain with and without contrast  - TTE (with Bubble Study if age 60 yrs or less), consider FRIEDA after  - Telemetry, EKG  - Bedside Glucose Monitoring  - A1c, Lipid Panel, Troponin x 3  - PT/OT/SLP  - Stroke Education  - Euthermia, Euglycemia    - Inpt vs op cards consult for PFO closure    My recommendations are based on the information provided over the phone by Rajiv Rodriguez's in-person providers. They are not intended to replace the clinical judgment of his in-person providers. I was not requested to personally see or examine the patient at this time.    The Stroke Staff is Dr. Fritz Car MD  Vascular Neurology " "Fellow  To page me or covering stroke neurology team member, click here: AMCOM   Choose \"On Call\" tab at top, then search dropdown box for \"Neurology Adult\", select location, press Enter, then look for stroke/neuro ICU/telestroke.      "

## 2022-06-11 NOTE — DISCHARGE SUMMARY
[facility]  Hospitalist Discharge Summary      Date of Admission:  6/10/2022  Date of Discharge:  6/11/2022  Discharging Provider: Anh Beck MD  Discharge Service: Hospitalist Service    Discharge Diagnoses   Principal Problem:    Cerebrovascular accident (CVA), unspecified mechanism (H)  Active Problems:    Mixed hyperlipidemia    Schizophrenia (H)    PFO (patent foramen ovale)    Anxiety    Follow-ups Needed After Discharge     Follow-up Appointments     Follow-up and recommended labs and tests       Follow up with primary care provider, Steven Duane Semmler, within 7 days   to evaluate medication change, to evaluate treatment change, for hospital   follow- up, and to have referrals placed for you.   I am recommending that you have Care Coordination and Neurology referrals   placed for you if needed. Follow up with your Cardiology team as well to   discuss surgery.  You also need an echocardiogram (TTE with bubble study) done which can   either be done at [place] or your primary can order one for you through   [facility].             Discharge Disposition   Discharged to home  Condition at discharge: Good    Hospital Course      Rajiv Rodriguez is a 67 year old male admitted on 6/10/2022. He presented to the emergency department for evaluation of left arm and leg numbness and incoordination and was found to have an acute stroke for which he is being admitted for further evaluation and treatment.    Cerebrovascular accident (CVA), unspecified mechanism  PFO (patent foramen ovale)   Mixed hyperlipidemia  Prior history of CVA in 2012. Has a known PFO which has not yet been repaired. Presented with transient left arm followed by left leg weakness / incoordination. MRI with acute ischemia involving right precentral gyrus. EKG shows sinus rhythm with PACs. No known history of arrhythmias. Taking aspirin 81 mg and Plavix 75 mg at time of admission. Case discussed between emergency department and  stroke neuro.  - Stop plavix  - continue aspirin  - Ticagrelor 180 mg load, followed by 90 mg bid  - P2Y12 level was w/in normal limits  - TTE with bubble could not be done inpatient - will need as outpatient  - Bilateral lower extremity ultrasound - thrombus was ruled out  - Increased from 40mg to 80mg lipitor daily due to LDL 97. Goal should be <70  - Recommended referral to Neuro and reminded to f/u with Cardiology after discharge    Schizophrenia   Stable mood on admission Managed prior to admission with Abilify 20 mg daily, Zyprexa 5 mg qam / 10 mg qhs, and prn gabapentin (for anxiety).  - reported he was not taking any gabapentin, discontinued  - given some hydroxyzine to try on discharge after mentioning some anxiety since stroke    Insurance issues / financial barrier  Patient lost his insurance, which is preventing him from having his PFO repaired.   - Case management consulted but patient discharged on hospital day 1  - recommend referral to Case Management through his PCP since he is outside of our system      Consultations This Hospital Stay   PHARMACY IP CONSULT  PHARMACY IP CONSULT  PHARMACY IP CONSULT  PHYSICAL THERAPY ADULT IP CONSULT  OCCUPATIONAL THERAPY ADULT IP CONSULT  REHAB ADMISSIONS LIAISON IP CONSULT  CARE MANAGEMENT / SOCIAL WORK IP CONSULT  CARE MANAGEMENT / SOCIAL WORK IP CONSULT  PHYSICAL THERAPY ADULT IP CONSULT  SMOKING CESSATION PROGRAM IP CONSULT    Code Status   Prior    Time Spent on this Encounter   I, Anh Beck MD, personally saw the patient today and spent greater than 30 minutes discharging this patient.       Anh Beck MD  [facility] SURGICAL  [address] [address]  Phone: [phone]  Fax: [phone]  ______________________________________________________________________    Physical Exam   Vital Signs: Temp: 98  F (36.7  C) Temp src: Oral BP: 112/67 Pulse: 58   Resp: 17 SpO2: 96 % O2 Device: None (Room air)    Weight: 181  lbs 14.07 oz  Constitutional: awake, alert, cooperative, no apparent distress, and appears stated age  Eyes: Lids and lashes normal, pupils equal, round and reactive to light, extra ocular muscles intact, sclera clear, conjunctiva normal  ENT: Normocephalic, without obvious abnormality, atraumatic  Respiratory: No increased work of breathing, good air exchange, clear to auscultation bilaterally, no crackles or wheezing  Cardiovascular: Normal apical impulse, regular rate and rhythm, normal S1 and S2, no murmurs  GI: Normal bowel sounds, soft, non-distended, non-tender  Skin: no bruising or bleeding, normal skin color, texture, turgor and no rashes  Musculoskeletal: There is no redness, warmth, or swelling of the joints. Tone is normal.  Neuro/psych: Awake, alert, oriented, calm and normal eye contact. 5/5 strength in all extremities. Heel-to-shin was normal. Rapid alternating movements and finger to nose slightly impaired on L       Primary Care Physician   Steven Duane Semmler    Discharge Orders      Reason for your hospital stay    Stroke.     Activity    Your activity upon discharge: activity as tolerated     Follow-up and recommended labs and tests     Follow up with primary care provider, Steven Duane Semmler, within 7 days to evaluate medication change, to evaluate treatment change, for hospital follow- up, and to have referrals placed for you.   I am recommending that you have Care Coordination and Neurology referrals placed for you if needed. Follow up with your Cardiology team as well to discuss surgery.  You also need an echocardiogram (TTE with bubble study) done which can either be done at [place] or your primary can order one for you through [facility].     Diet    Follow this diet upon discharge: Orders Placed This Encounter      Combination Diet Regular Diet; Low Saturated Fat Diet       Significant Results and Procedures   Most Recent 3 CBC's:  Recent Labs   Lab Test 06/11/22  0513 06/10/22  1540  04/11/21  0209   WBC 5.9 6.7 6.7   HGB 14.3 14.7 14.8   MCV 81 82 85   * 167 189     Most Recent 3 BMP's:  Recent Labs   Lab Test 06/11/22  0806 06/11/22  0513 06/10/22  1540 04/11/21  0209   NA  --  144 144 140   POTASSIUM  --  3.9 3.9 4.4   CHLORIDE  --  114* 112* 109   CO2  --  23 27 24   BUN  --  27 30 22   CR  --  0.96 1.18 0.95   ANIONGAP  --  7 5 7   LEROY  --  8.8 9.2 8.6   * 99 107* 100*     Most Recent 2 LFT's:  Recent Labs   Lab Test 04/12/21  0711 03/02/16  0719   AST 18 23   ALT 24 21   ALKPHOS 111 107   BILITOTAL 0.6 0.6     Most Recent 3 INR's:  Recent Labs   Lab Test 06/10/22  1540 07/13/20  1040   INR 1.11 1.08   ,   Results for orders placed or performed during the hospital encounter of 06/10/22   MRA Brain ([place]) wo Contrast    Narrative    EXAM: MR BRAIN W/O CONTRAST, MRA BRAIN ([place]) W/O CONTRAST, MRA NECK (CAROTIDS) W/O CONTRAST  LOCATION: [facility]  DATE/TIME: 6/10/2022 6:27 PM    INDICATION: Weakness. Dizziness. Transient ischemic attack (TIA)  COMPARISON: MRI brain dated 07/13/2020  TECHNIQUE:  1) Routine multiplanar multisequence head MRI without intravenous contrast.  2) 3D time-of-flight head MRA without intravenous contrast.  3) Neck MRA without IV contrast. Stenosis measurements made according to NASCET criteria unless otherwise specified.    FINDINGS:  HEAD MRI:  INTRACRANIAL CONTENTS: Restricted diffusion with associated T2/FLAIR signal hyperintensity predominantly involving the right precentral gyrus suspicious for acute ischemia. Additional chronic bilateral cerebellar hemisphere lacunar infarct. No mass,   acute hemorrhage, or extra-axial fluid collections. Scattered nonspecific T2/FLAIR hyperintensities within the cerebral white matter most consistent with sequelae of mild chronic microangiopathy.     Mild cerebral volume loss without hydrocephalus. Patent basal cisterns. Normal position of the cerebellar tonsils.      SELLA: No abnormality accounting for technique.    OSSEOUS STRUCTURES/SOFT TISSUES: No aggressive osseous lesion involving the calvarium or visualized upper cervical spine. Maintained major intracranial vascular flow voids.    ORBITS: No significant orbital abnormality accounting for technique.     SINUSES/MASTOIDS: Mild mucosal thickening scattered about the paranasal sinuses. No middle ear or mastoid effusion.     HEAD MRA:  ANTERIOR CIRCULATION: No stenosis/occlusion, aneurysm, or high flow vascular malformation. The anterior communicating artery is patent. The posterior communicating arteries are hypoplastic/absent.     POSTERIOR CIRCULATION: No stenosis/occlusion, aneurysm, or high flow vascular malformation. Balanced vertebrobasilar circulation.     NECK MRA:  RIGHT CAROTID: Normal course and persist caliber of the common carotid artery. Normal course and persist caliber of the extracranial internal carotid artery without evidence of stenosis or dissection.    LEFT CAROTID: Normal course and persist caliber of the common carotid artery. Normal course and persist caliber of the extracranial internal carotid artery without evidence of stenosis or dissection.    VERTEBRAL ARTERIES: Balance configuration without stenosis or dissection.    AORTIC ARCH: Classic three-vessel arch. The origins of the great vessels are unremarkable without significant stenosis.      Impression    IMPRESSION:    HEAD MRI:  1. Findings suspicious for acute ischemia involving the right precentral gyrus.     HEAD MRA:   1. Unremarkable intracranial vasculature.    NECK MRA:  1. Unremarkable neck vasculature.    - - - - - - - - - - - - - - - - - - - - - - - - - - - - - - - - - - - - - - - - - - - - - - - - - - - - - - - - - - - - - - - - - - - - - - - - - - - -   The results above were discussed with Dr. Brewer on 6/10/2022 7:11 PM by Dr. Nathaniel Hansen.  - - - - - - - - - - - - - - - - - - - - - - - - - - - - - - - - - - - - - - -  - - - - - - - - - - - - - - - - - - - - - - - - - - - - - - - - - - - - -   MRA Angiogram Neck w/o Contrast    Narrative    EXAM: MR BRAIN W/O CONTRAST, MRA BRAIN ([place]) W/O CONTRAST, MRA NECK (CAROT[place]) W/O CONTRAST  LOCATION: [facility]  DATE/TIME: 6/10/2022 6:27 PM    INDICATION: Weakness. Dizziness. Transient ischemic attack (TIA)  COMPARISON: MRI brain dated 07/13/2020  TECHNIQUE:  1) Routine multiplanar multisequence head MRI without intravenous contrast.  2) 3D time-of-flight head MRA without intravenous contrast.  3) Neck MRA without IV contrast. Stenosis measurements made according to NASCET criteria unless otherwise specified.    FINDINGS:  HEAD MRI:  INTRACRANIAL CONTENTS: Restricted diffusion with associated T2/FLAIR signal hyperintensity predominantly involving the right precentral gyrus suspicious for acute ischemia. Additional chronic bilateral cerebellar hemisphere lacunar infarct. No mass,   acute hemorrhage, or extra-axial fluid collections. Scattered nonspecific T2/FLAIR hyperintensities within the cerebral white matter most consistent with sequelae of mild chronic microangiopathy.     Mild cerebral volume loss without hydrocephalus. Patent basal cisterns. Normal position of the cerebellar tonsils.     SELLA: No abnormality accounting for technique.    OSSEOUS STRUCTURES/SOFT TISSUES: No aggressive osseous lesion involving the calvarium or visualized upper cervical spine. Maintained major intracranial vascular flow voids.    ORBITS: No significant orbital abnormality accounting for technique.     SINUSES/MASTOIDS: Mild mucosal thickening scattered about the paranasal sinuses. No middle ear or mastoid effusion.     HEAD MRA:  ANTERIOR CIRCULATION: No stenosis/occlusion, aneurysm, or high flow vascular malformation. The anterior communicating artery is patent. The posterior communicating arteries are hypoplastic/absent.     POSTERIOR CIRCULATION: No  stenosis/occlusion, aneurysm, or high flow vascular malformation. Balanced vertebrobasilar circulation.     NECK MRA:  RIGHT CAROTID: Normal course and persist caliber of the common carotid artery. Normal course and persist caliber of the extracranial internal carotid artery without evidence of stenosis or dissection.    LEFT CAROTID: Normal course and persist caliber of the common carotid artery. Normal course and persist caliber of the extracranial internal carotid artery without evidence of stenosis or dissection.    VERTEBRAL ARTERIES: Balance configuration without stenosis or dissection.    AORTIC ARCH: Classic three-vessel arch. The origins of the great vessels are unremarkable without significant stenosis.      Impression    IMPRESSION:    HEAD MRI:  1. Findings suspicious for acute ischemia involving the right precentral gyrus.     HEAD MRA:   1. Unremarkable intracranial vasculature.    NECK MRA:  1. Unremarkable neck vasculature.    - - - - - - - - - - - - - - - - - - - - - - - - - - - - - - - - - - - - - - - - - - - - - - - - - - - - - - - - - - - - - - - - - - - - - - - - - - - -   The results above were discussed with Dr. Brewer on 6/10/2022 7:11 PM by Dr. Nathaniel Hansen.  - - - - - - - - - - - - - - - - - - - - - - - - - - - - - - - - - - - - - - - - - - - - - - - - - - - - - - - - - - - - - - - - - - - - - - - - - - - -   MR Brain w/o Contrast    Narrative    EXAM: MR BRAIN W/O CONTRAST, MRA BRAIN ([place]) W/O CONTRAST, MRA NECK ([place]) W/O CONTRAST  LOCATION: [facility]  DATE/TIME: 6/10/2022 6:27 PM    INDICATION: Weakness. Dizziness. Transient ischemic attack (TIA)  COMPARISON: MRI brain dated 07/13/2020  TECHNIQUE:  1) Routine multiplanar multisequence head MRI without intravenous contrast.  2) 3D time-of-flight head MRA without intravenous contrast.  3) Neck MRA without IV contrast. Stenosis measurements made according to NASCET criteria unless  otherwise specified.    FINDINGS:  HEAD MRI:  INTRACRANIAL CONTENTS: Restricted diffusion with associated T2/FLAIR signal hyperintensity predominantly involving the right precentral gyrus suspicious for acute ischemia. Additional chronic bilateral cerebellar hemisphere lacunar infarct. No mass,   acute hemorrhage, or extra-axial fluid collections. Scattered nonspecific T2/FLAIR hyperintensities within the cerebral white matter most consistent with sequelae of mild chronic microangiopathy.     Mild cerebral volume loss without hydrocephalus. Patent basal cisterns. Normal position of the cerebellar tonsils.     SELLA: No abnormality accounting for technique.    OSSEOUS STRUCTURES/SOFT TISSUES: No aggressive osseous lesion involving the calvarium or visualized upper cervical spine. Maintained major intracranial vascular flow voids.    ORBITS: No significant orbital abnormality accounting for technique.     SINUSES/MASTOIDS: Mild mucosal thickening scattered about the paranasal sinuses. No middle ear or mastoid effusion.     HEAD MRA:  ANTERIOR CIRCULATION: No stenosis/occlusion, aneurysm, or high flow vascular malformation. The anterior communicating artery is patent. The posterior communicating arteries are hypoplastic/absent.     POSTERIOR CIRCULATION: No stenosis/occlusion, aneurysm, or high flow vascular malformation. Balanced vertebrobasilar circulation.     NECK MRA:  RIGHT CAROTID: Normal course and persist caliber of the common carotid artery. Normal course and persist caliber of the extracranial internal carotid artery without evidence of stenosis or dissection.    LEFT CAROTID: Normal course and persist caliber of the common carotid artery. Normal course and persist caliber of the extracranial internal carotid artery without evidence of stenosis or dissection.    VERTEBRAL ARTERIES: Balance configuration without stenosis or dissection.    AORTIC ARCH: Classic three-vessel arch. The origins of the great  vessels are unremarkable without significant stenosis.      Impression    IMPRESSION:    HEAD MRI:  1. Findings suspicious for acute ischemia involving the right precentral gyrus.     HEAD MRA:   1. Unremarkable intracranial vasculature.    NECK MRA:  1. Unremarkable neck vasculature.    - - - - - - - - - - - - - - - - - - - - - - - - - - - - - - - - - - - - - - - - - - - - - - - - - - - - - - - - - - - - - - - - - - - - - - - - - - - -   The results above were discussed with Dr. Brewer on 6/10/2022 7:11 PM by Dr. Nathaniel Hansen.  - - - - - - - - - - - - - - - - - - - - - - - - - - - - - - - - - - - - - - - - - - - - - - - - - - - - - - - - - - - - - - - - - - - - - - - - - - - -   US Lower Extremity Venous Duplex Bilateral    Narrative    EXAM: ULTRASOUND LOWER EXTREMITY VENOUS DUPLEX BILATERAL  LOCATION: [facility]  DATE/TIME: 06/11/2022, 9:05 AM    INDICATION: Rule out DVT.  COMPARISON: None.  TECHNIQUE: Venous Duplex ultrasound of bilateral lower extremities with and without compression, augmentation and duplex. Color flow and spectral Doppler with waveform analysis performed.    FINDINGS: Exam includes the common femoral, femoral, popliteal veins as well as segmentally visualized deep calf veins and greater saphenous vein.     RIGHT: No deep vein thrombosis. No superficial thrombophlebitis. No popliteal cyst.    LEFT: No deep vein thrombosis. No superficial thrombophlebitis. No popliteal cyst.      Impression    IMPRESSION:  1.  No deep venous thrombosis in the bilateral lower extremities.           Discharge Medications   Discharge Medication List as of 6/11/2022 12:16 PM      CONTINUE these medications which have CHANGED    Details   aspirin (ASA) 81 MG EC tablet Take 1 tablet (81 mg) by mouth daily, Disp-30 tablet, R-0, E-Prescribe      atorvastatin (LIPITOR) 80 MG tablet Take 1 tablet (80 mg) by mouth daily, Disp-30 tablet, R-0, E-Prescribe      hydrOXYzine (VISTARIL) 25 MG  capsule Take 1-2 capsules (25-50 mg) by mouth 3 times daily as needed for anxiety, Disp-60 capsule, R-0, E-Prescribe      ticagrelor (BRILINTA) 90 MG tablet Take 1 tablet (90 mg) by mouth 2 times daily, Disp-60 tablet, R-0, E-Prescribe         CONTINUE these medications which have NOT CHANGED    Details   ARIPiprazole (ABILIFY) 20 MG tablet Take 1 tablet (20 mg) by mouth every morning, Disp-30 tablet, R-0, E-Prescribe      OLANZapine (ZYPREXA) 10 MG tablet Take 1 tablet (10 mg) by mouth At Bedtime, Disp-30 tablet, R-0, E-Prescribe      OLANZapine (ZYPREXA) 5 MG tablet Take 1 tablet (5 mg) by mouth every morning, Disp-30 tablet, R-0, E-Prescribe         STOP taking these medications       clopidogrel (PLAVIX) 75 MG tablet Comments:   Reason for Stopping:         gabapentin (NEURONTIN) 100 MG capsule Comments:   Reason for Stopping:             Allergies   No Known Allergies

## 2022-06-13 ENCOUNTER — PATIENT OUTREACH (OUTPATIENT)
Dept: CARE COORDINATION | Facility: CLINIC | Age: 67
End: 2022-06-13

## 2022-06-13 DIAGNOSIS — Z71.89 OTHER SPECIFIED COUNSELING: ICD-10-CM

## 2022-06-13 NOTE — PROGRESS NOTES
Clinic Care Coordination Contact  [facility]: Post-Discharge Note  SITUATION                                                      Admission:    Admission Date: 06/10/22   Reason for Admission: CVA  Discharge:   Discharge Date: 06/11/22  Discharge Diagnosis: CVA    BACKGROUND                                                      Per hospital discharge summary and inpatient provider notes:    Rajiv Rodriguez is a 67 year old male admitted on 6/10/2022. He presented to the emergency department for evaluation of left arm and leg numbness and incoordination and was found to have an acute stroke for which he is being admitted for further evaluation and treatment.     Cerebrovascular accident (CVA), unspecified mechanism  PFO (patent foramen ovale)   Mixed hyperlipidemia  Prior history of CVA in 2012. Has a known PFO which has not yet been repaired. Presented with transient left arm followed by left leg weakness / incoordination. MRI with acute ischemia involving right precentral gyrus. EKG shows sinus rhythm with PACs. No known history of arrhythmias. Taking aspirin 81 mg and Plavix 75 mg at time of admission. Case discussed between emergency department and stroke neuro.  - Stop plavix  - continue aspirin  - Ticagrelor 180 mg load, followed by 90 mg bid  - P2Y12 level was w/in normal limits  - TTE with bubble could not be done inpatient - will need as outpatient  - Bilateral lower extremity ultrasound - thrombus was ruled out  - Increased from 40mg to 80mg lipitor daily due to LDL 97. Goal should be <70  - Recommended referral to Neuro and reminded to f/u with Cardiology after discharge     Schizophrenia   Stable mood on admission Managed prior to admission with Abilify 20 mg daily, Zyprexa 5 mg qam / 10 mg qhs, and prn gabapentin (for anxiety).  - reported he was not taking any gabapentin, discontinued  - given some hydroxyzine to try on discharge after mentioning some anxiety since stroke     Insurance issues /  "financial barrier  Patient lost his insurance, which is preventing him from having his PFO repaired.   - Case management consulted but patient discharged on hospital day 1  - recommend referral to Case Management through his PCP since he is outside of our system    ASSESSMENT      Enrollment  Primary Care Care Coordination Status: Not a Candidate    Discharge Assessment  How are you doing now that you are home?: Pt states \"I'm doing okay, but still having some SOB just about all the time every couple of minutes I have an episode, doesn't last long, had it in the hospital too not any worse about the same; I think it's one of the side effects of the Brilinta, I read it on their website.\"  RN advised pt discuss sx with his PCP, pt will call [facility] PCP to follow up today.  How are your symptoms? (Red Flag symptoms escalate to triage hotline per guidelines): Improved  Do you feel your condition is stable enough to be safe at home until your provider visit?: Yes  Does the patient have their discharge instructions? : Yes  Does the patient have questions regarding their discharge instructions? : No  Were you started on any new medications or were there changes to any of your previous medications? : Yes  Does the patient have all of their medications?: No (see comment)  Do you have questions regarding any of your medications? : No  Discharge follow-up appointment scheduled within 14 calendar days? : No  Is patient agreeable to assistance with scheduling? : No (Pt will schedule hospital follow up with [facility] PCP.  RN reminded pt to schedule annual physical in the near future if he's not had one in the past year.  Reminded pt to schedule follow up appt with Cardiology & Neurology, & talk w/PCP about CCC for ins.)         Post-op (Clinicians Only)  Did the patient have surgery or a procedure: No        PLAN                                                      Outpatient Plan:      Follow-up and recommended labs and " tests  Follow up with primary care provider, Steven Duane Semmler, within 7 days to evaluate medication change, to evaluate treatment change, for hospital follow- up, and to have referrals placed for you.  I am recommending that you have Care Coordination and Neurology referrals placed for you if needed. Follow up with your Cardiology team as well to discuss surgery.  You also need an echocardiogram (TTE with bubble study) done which can either be done at [place] or your primary can order one for you through [facility].    No future appointments.      For any urgent concerns, please contact our 24 hour nurse triage line: [phone] ([phone])         Kristi Christian RN

## 2022-12-26 ENCOUNTER — HEALTH MAINTENANCE LETTER (OUTPATIENT)
Age: 67
End: 2022-12-26

## 2023-06-02 ENCOUNTER — HEALTH MAINTENANCE LETTER (OUTPATIENT)
Age: 68
End: 2023-06-02

## 2024-06-23 ENCOUNTER — HEALTH MAINTENANCE LETTER (OUTPATIENT)
Age: 69
End: 2024-06-23